# Patient Record
Sex: MALE | Race: WHITE | Employment: OTHER | URBAN - METROPOLITAN AREA
[De-identification: names, ages, dates, MRNs, and addresses within clinical notes are randomized per-mention and may not be internally consistent; named-entity substitution may affect disease eponyms.]

---

## 2017-04-19 PROBLEM — M17.11 PRIMARY OSTEOARTHRITIS OF RIGHT KNEE: Status: ACTIVE | Noted: 2017-04-19

## 2017-04-19 PROBLEM — M11.261 CHONDROCALCINOSIS OF RIGHT KNEE: Status: ACTIVE | Noted: 2017-04-19

## 2017-04-19 PROBLEM — M16.11 PRIMARY OSTEOARTHRITIS OF RIGHT HIP: Status: ACTIVE | Noted: 2017-04-19

## 2017-04-19 PROBLEM — M70.61 TROCHANTERIC BURSITIS, RIGHT HIP: Status: ACTIVE | Noted: 2017-04-19

## 2017-04-25 PROCEDURE — 82607 VITAMIN B-12: CPT | Performed by: OTHER

## 2017-05-19 PROBLEM — R26.89 IMPAIRED GAIT AND MOBILITY: Status: ACTIVE | Noted: 2017-05-19

## 2017-05-19 PROBLEM — M47.896 OTHER OSTEOARTHRITIS OF SPINE, LUMBAR REGION: Status: ACTIVE | Noted: 2017-05-19

## 2017-05-19 PROBLEM — G62.9: Status: ACTIVE | Noted: 2017-05-19

## 2017-06-09 ENCOUNTER — APPOINTMENT (OUTPATIENT)
Dept: GENERAL RADIOLOGY | Age: 67
End: 2017-06-09
Attending: EMERGENCY MEDICINE
Payer: MEDICARE

## 2017-06-09 ENCOUNTER — HOSPITAL ENCOUNTER (EMERGENCY)
Age: 67
Discharge: HOME | End: 2017-06-09
Attending: EMERGENCY MEDICINE
Payer: MEDICARE

## 2017-06-09 ENCOUNTER — APPOINTMENT (OUTPATIENT)
Dept: ULTRASOUND IMAGING | Age: 67
End: 2017-06-09
Attending: EMERGENCY MEDICINE
Payer: MEDICARE

## 2017-06-09 VITALS
HEIGHT: 73 IN | TEMPERATURE: 98 F | HEART RATE: 71 BPM | DIASTOLIC BLOOD PRESSURE: 97 MMHG | RESPIRATION RATE: 18 BRPM | WEIGHT: 315 LBS | OXYGEN SATURATION: 95 % | SYSTOLIC BLOOD PRESSURE: 213 MMHG | BODY MASS INDEX: 41.75 KG/M2

## 2017-06-09 DIAGNOSIS — R10.13 EPIGASTRIC PAIN: ICD-10-CM

## 2017-06-09 DIAGNOSIS — K21.9 GASTROESOPHAGEAL REFLUX DISEASE, ESOPHAGITIS PRESENCE NOT SPECIFIED: Primary | ICD-10-CM

## 2017-06-09 PROCEDURE — 71020 XR CHEST PA + LAT CHEST (CPT=71020): CPT | Performed by: EMERGENCY MEDICINE

## 2017-06-09 PROCEDURE — 76700 US EXAM ABDOM COMPLETE: CPT | Performed by: EMERGENCY MEDICINE

## 2017-06-09 PROCEDURE — 93010 ELECTROCARDIOGRAM REPORT: CPT

## 2017-06-09 PROCEDURE — 99285 EMERGENCY DEPT VISIT HI MDM: CPT

## 2017-06-09 PROCEDURE — 85025 COMPLETE CBC W/AUTO DIFF WBC: CPT | Performed by: EMERGENCY MEDICINE

## 2017-06-09 PROCEDURE — 36415 COLL VENOUS BLD VENIPUNCTURE: CPT

## 2017-06-09 PROCEDURE — 93005 ELECTROCARDIOGRAM TRACING: CPT

## 2017-06-09 PROCEDURE — 83690 ASSAY OF LIPASE: CPT | Performed by: EMERGENCY MEDICINE

## 2017-06-09 PROCEDURE — 84484 ASSAY OF TROPONIN QUANT: CPT | Performed by: EMERGENCY MEDICINE

## 2017-06-09 PROCEDURE — 80053 COMPREHEN METABOLIC PANEL: CPT | Performed by: EMERGENCY MEDICINE

## 2017-06-09 RX ORDER — MAGNESIUM HYDROXIDE/ALUMINUM HYDROXICE/SIMETHICONE 120; 1200; 1200 MG/30ML; MG/30ML; MG/30ML
30 SUSPENSION ORAL ONCE
Status: COMPLETED | OUTPATIENT
Start: 2017-06-09 | End: 2017-06-09

## 2017-06-09 NOTE — ED NOTES
Bedside report taken from [facility]. Patient states he has not taken any of his meds this morning and has not been sleeping well the last couple of days only getting a couple of hours a day.

## 2017-06-09 NOTE — ED NOTES
MD in to discuss plan of care with patient.   Patient is to take his BP meds at home and follow up with his PMD.

## 2017-06-09 NOTE — ED PROVIDER NOTES
Patient Seen in: THE [facility] Emergency Department In [place]    History   Patient presents with:  Dyspnea ROLDAN SOB (respiratory)  Hypertension (cardiovascular)    Stated Complaint: elevated bp, shortness of breath, back pain    HPI    Patient is a 66-year-o AmLODIPine Besylate 5 MG Oral Tab,  Take 1 tablet (5 mg total) by mouth daily. Potassium Chloride ER 10 MEQ Oral Tab CR,  Take 1 tablet (10 mEq total) by mouth 2 (two) times daily.    tamsulosin HCl 0.4 MG Oral Cap,  TAKE 1 CAPSULE (0.4 MG TOTAL) BY MOUTH Sister    • Hypertension Sister          Smoking Status: Never Smoker                      Smokeless Status: Never Used                        Alcohol Use: No                Review of Systems    Positive for stated complaint: elevated bp, shortness of steve components within normal limits   CBC W/ DIFFERENTIAL - Abnormal; Notable for the following:     RDW-SD 48.2 (*)     All other components within normal limits   TROPONIN I - Normal   CBC WITH DIFFERENTIAL WITH PLATELET    Narrative:      The following order Patient return of chest pain, shortness of breath, new or worse symptoms.         Disposition and Plan     Clinical Impression:  Gastroesophageal reflux disease, esophagitis presence not specified  (primary encounter diagnosis)  Epigastric pain    Disposit

## 2017-06-15 ENCOUNTER — HOSPITAL ENCOUNTER (OUTPATIENT)
Dept: ULTRASOUND IMAGING | Age: 67
Discharge: HOME | End: 2017-06-15
Attending: NURSE PRACTITIONER
Payer: MEDICARE

## 2017-06-15 DIAGNOSIS — I10 HYPERTENSION, ESSENTIAL: ICD-10-CM

## 2017-06-19 ENCOUNTER — HOSPITAL ENCOUNTER (OUTPATIENT)
Dept: ULTRASOUND IMAGING | Age: 67
Discharge: HOME | End: 2017-06-19
Attending: NURSE PRACTITIONER
Payer: MEDICARE

## 2017-06-19 PROCEDURE — 76775 US EXAM ABDO BACK WALL LIM: CPT | Performed by: NURSE PRACTITIONER

## 2017-06-19 PROCEDURE — 93975 VASCULAR STUDY: CPT | Performed by: NURSE PRACTITIONER

## 2017-07-25 PROCEDURE — 82607 VITAMIN B-12: CPT | Performed by: OTHER

## 2017-11-06 ENCOUNTER — HOSPITAL ENCOUNTER (INPATIENT)
Facility: HOSPITAL | Age: 67
LOS: 4 days | Discharge: HOME | DRG: 603 | End: 2017-11-10
Attending: EMERGENCY MEDICINE | Admitting: HOSPITALIST
Payer: MEDICARE

## 2017-11-06 ENCOUNTER — APPOINTMENT (OUTPATIENT)
Dept: ULTRASOUND IMAGING | Age: 67
DRG: 603 | End: 2017-11-06
Attending: EMERGENCY MEDICINE
Payer: MEDICARE

## 2017-11-06 DIAGNOSIS — I10 ESSENTIAL HYPERTENSION: ICD-10-CM

## 2017-11-06 DIAGNOSIS — L03.90 CELLULITIS, UNSPECIFIED CELLULITIS SITE: Primary | ICD-10-CM

## 2017-11-06 PROCEDURE — 99285 EMERGENCY DEPT VISIT HI MDM: CPT

## 2017-11-06 PROCEDURE — 96365 THER/PROPH/DIAG IV INF INIT: CPT

## 2017-11-06 PROCEDURE — 5A09357 ASSISTANCE WITH RESPIRATORY VENTILATION, LESS THAN 24 CONSECUTIVE HOURS, CONTINUOUS POSITIVE AIRWAY PRESSURE: ICD-10-PCS | Performed by: INTERNAL MEDICINE

## 2017-11-06 PROCEDURE — 84132 ASSAY OF SERUM POTASSIUM: CPT | Performed by: HOSPITALIST

## 2017-11-06 PROCEDURE — 36415 COLL VENOUS BLD VENIPUNCTURE: CPT

## 2017-11-06 PROCEDURE — 80048 BASIC METABOLIC PNL TOTAL CA: CPT | Performed by: EMERGENCY MEDICINE

## 2017-11-06 PROCEDURE — 96375 TX/PRO/DX INJ NEW DRUG ADDON: CPT

## 2017-11-06 PROCEDURE — 93971 EXTREMITY STUDY: CPT | Performed by: EMERGENCY MEDICINE

## 2017-11-06 PROCEDURE — 83036 HEMOGLOBIN GLYCOSYLATED A1C: CPT | Performed by: HOSPITALIST

## 2017-11-06 PROCEDURE — 87493 C DIFF AMPLIFIED PROBE: CPT | Performed by: HOSPITALIST

## 2017-11-06 PROCEDURE — 82962 GLUCOSE BLOOD TEST: CPT

## 2017-11-06 PROCEDURE — 85025 COMPLETE CBC W/AUTO DIFF WBC: CPT | Performed by: EMERGENCY MEDICINE

## 2017-11-06 PROCEDURE — 87040 BLOOD CULTURE FOR BACTERIA: CPT | Performed by: EMERGENCY MEDICINE

## 2017-11-06 PROCEDURE — 94660 CPAP INITIATION&MGMT: CPT

## 2017-11-06 RX ORDER — ARIPIPRAZOLE 15 MG/1
40 TABLET ORAL EVERY 4 HOURS
Status: COMPLETED | OUTPATIENT
Start: 2017-11-06 | End: 2017-11-06

## 2017-11-06 RX ORDER — ACETAMINOPHEN 325 MG/1
650 TABLET ORAL EVERY 6 HOURS PRN
Status: DISCONTINUED | OUTPATIENT
Start: 2017-11-06 | End: 2017-11-10

## 2017-11-06 RX ORDER — PREGABALIN 50 MG/1
50 CAPSULE ORAL DAILY
Status: DISCONTINUED | OUTPATIENT
Start: 2017-11-06 | End: 2017-11-10

## 2017-11-06 RX ORDER — CLONIDINE HYDROCHLORIDE 0.2 MG/1
0.2 TABLET ORAL 3 TIMES DAILY
Status: DISCONTINUED | OUTPATIENT
Start: 2017-11-06 | End: 2017-11-09

## 2017-11-06 RX ORDER — HEPARIN SODIUM 5000 [USP'U]/ML
5000 INJECTION, SOLUTION INTRAVENOUS; SUBCUTANEOUS EVERY 8 HOURS SCHEDULED
Status: DISCONTINUED | OUTPATIENT
Start: 2017-11-06 | End: 2017-11-06

## 2017-11-06 RX ORDER — OLMESARTAN MEDOXOMIL AND HYDROCHLOROTHIAZIDE 40/25 40; 25 MG/1; MG/1
1 TABLET ORAL
Status: DISCONTINUED | OUTPATIENT
Start: 2017-11-06 | End: 2017-11-06 | Stop reason: RX

## 2017-11-06 RX ORDER — POTASSIUM CHLORIDE 20 MEQ/1
40 TABLET, EXTENDED RELEASE ORAL EVERY 4 HOURS
Status: DISCONTINUED | OUTPATIENT
Start: 2017-11-06 | End: 2017-11-06

## 2017-11-06 RX ORDER — BISACODYL 10 MG
10 SUPPOSITORY, RECTAL RECTAL
Status: DISCONTINUED | OUTPATIENT
Start: 2017-11-06 | End: 2017-11-10

## 2017-11-06 RX ORDER — DEXTROSE MONOHYDRATE 25 G/50ML
50 INJECTION, SOLUTION INTRAVENOUS
Status: DISCONTINUED | OUTPATIENT
Start: 2017-11-06 | End: 2017-11-10

## 2017-11-06 RX ORDER — ATORVASTATIN CALCIUM 10 MG/1
10 TABLET, FILM COATED ORAL NIGHTLY
Status: DISCONTINUED | OUTPATIENT
Start: 2017-11-06 | End: 2017-11-10

## 2017-11-06 RX ORDER — ALLOPURINOL 300 MG/1
300 TABLET ORAL DAILY
Status: DISCONTINUED | OUTPATIENT
Start: 2017-11-06 | End: 2017-11-10

## 2017-11-06 RX ORDER — ALFUZOSIN HYDROCHLORIDE 10 MG/1
10 TABLET, EXTENDED RELEASE ORAL
Status: DISCONTINUED | OUTPATIENT
Start: 2017-11-06 | End: 2017-11-10

## 2017-11-06 RX ORDER — HEPARIN SODIUM 5000 [USP'U]/ML
7500 INJECTION, SOLUTION INTRAVENOUS; SUBCUTANEOUS EVERY 8 HOURS SCHEDULED
Status: DISCONTINUED | OUTPATIENT
Start: 2017-11-06 | End: 2017-11-10

## 2017-11-06 RX ORDER — ALBUTEROL SULFATE 90 UG/1
2 AEROSOL, METERED RESPIRATORY (INHALATION) EVERY 4 HOURS PRN
Status: DISCONTINUED | OUTPATIENT
Start: 2017-11-06 | End: 2017-11-10

## 2017-11-06 RX ORDER — MORPHINE SULFATE 4 MG/ML
4 INJECTION, SOLUTION INTRAMUSCULAR; INTRAVENOUS ONCE
Status: COMPLETED | OUTPATIENT
Start: 2017-11-06 | End: 2017-11-06

## 2017-11-06 RX ORDER — SERTRALINE HYDROCHLORIDE 100 MG/1
100 TABLET, FILM COATED ORAL DAILY
Status: DISCONTINUED | OUTPATIENT
Start: 2017-11-06 | End: 2017-11-10

## 2017-11-06 RX ORDER — AMLODIPINE BESYLATE 5 MG/1
10 TABLET ORAL DAILY
Status: DISCONTINUED | OUTPATIENT
Start: 2017-11-06 | End: 2017-11-10

## 2017-11-06 RX ORDER — ONDANSETRON 2 MG/ML
4 INJECTION INTRAMUSCULAR; INTRAVENOUS EVERY 6 HOURS PRN
Status: DISCONTINUED | OUTPATIENT
Start: 2017-11-06 | End: 2017-11-10

## 2017-11-06 RX ORDER — SODIUM PHOSPHATE, DIBASIC AND SODIUM PHOSPHATE, MONOBASIC 7; 19 G/133ML; G/133ML
1 ENEMA RECTAL ONCE AS NEEDED
Status: DISCONTINUED | OUTPATIENT
Start: 2017-11-06 | End: 2017-11-10

## 2017-11-06 RX ORDER — DOCUSATE SODIUM 100 MG/1
100 CAPSULE, LIQUID FILLED ORAL 2 TIMES DAILY
Status: DISCONTINUED | OUTPATIENT
Start: 2017-11-06 | End: 2017-11-10

## 2017-11-06 RX ORDER — POTASSIUM CHLORIDE 20 MEQ/1
40 TABLET, EXTENDED RELEASE ORAL ONCE
Status: COMPLETED | OUTPATIENT
Start: 2017-11-06 | End: 2017-11-06

## 2017-11-06 RX ORDER — COLCHICINE 0.6 MG/1
0.6 TABLET ORAL
Status: DISCONTINUED | OUTPATIENT
Start: 2017-11-06 | End: 2017-11-10

## 2017-11-06 RX ORDER — POLYETHYLENE GLYCOL 3350 17 G/17G
17 POWDER, FOR SOLUTION ORAL DAILY PRN
Status: DISCONTINUED | OUTPATIENT
Start: 2017-11-06 | End: 2017-11-10

## 2017-11-06 RX ORDER — HYDRALAZINE HYDROCHLORIDE 50 MG/1
100 TABLET, FILM COATED ORAL 3 TIMES DAILY
Status: DISCONTINUED | OUTPATIENT
Start: 2017-11-06 | End: 2017-11-10

## 2017-11-06 NOTE — PAYOR COMM NOTE
--------------  ADMISSION REVIEW     Payor: Mag Davidson  Subscriber #:  Iram Heady  Authorization Number: N/[id number]    Admit date: 11/6/17  Admit time: 2008       Admitting Physician: Cheryl Blanchard MD  Attending Physician:  Art Engle DO  Primary Care Physi (37 °C)   Resp 16   Ht 185.4 cm (6' 1\")   Wt (!) 165.6 kg   SpO2 94%   BMI 48.16 kg/m²[SA.2]         Physical Exam   Constitutional: He is oriented to person, place, and time. He appears well-developed and well-nourished. Non-toxic appearance.  No distres the past 24 hours he will be admitted for further management.   Patient stable for the floor at this time.[SA.3]        Disposition and Plan     Clinical Impression:[SA.1]  Cellulitis, unspecified cellulitis site  (primary encounter diagnosis)[SA.2]    Disp furosemide (LASIX) tab 60 mg     Date Action Dose Route User    11/6/2017 1013 Given 60 mg Oral Silvino Costello RN

## 2017-11-06 NOTE — ED NOTES
PT return from [address] at this time, EAS contacted 40 min ETA given at this time

## 2017-11-06 NOTE — H&P
[facility] Hospitalist H&P       CC: Patient presents with:  Cellulitis (integumentary, infectious)       PCP: Fariba Godinez MD    History of Present Illness:  Mr. Viramontes Sender is a 70 yo male with PMH of morbid obesity, Dm, HTN, LATESHA who presented with concern Tab Take 1 tablet (100 mg total) by mouth daily. Disp: 90 tablet Rfl: 3   furosemide 20 MG Oral Tab Take 3 tablets (60 mg total) by mouth daily. Disp: 180 tablet Rfl: 3   tamsulosin HCl 0.4 MG Oral Cap TAKE 1 CAPSULE (0.4 MG TOTAL) BY MOUTH ONCE DAILY.  Dis Sister        Review of Systems  12 point ROS negative, except as stated in HPI    OBJECTIVE:  /58 (BP Location: Right arm)   Pulse 83   Temp 98.8 °F (37.1 °C) (Oral)   Resp 22   Ht 6' 1\" (1.854 m)   Wt (!) 365 lb (165.6 kg)   SpO2 95%   BMI 48.16 k venous system. B-mode two-dimensional images of the vascular structures, Doppler spectral analysis, and color flow. Doppler imaging were performed.   The following veins were imaged:  Common, deep, and superficial femoral, popliteal, sapheno-femoral juncti

## 2017-11-06 NOTE — CM/SW NOTE
11/06/17 1400   CM/SW Referral Data   Referral Source Physician;Social Work (self-referral)   Reason for Referral Discharge planning   Informant Patient   Pertinent Medical Hx   Primary Care Physician Name [facility]   Patient Info   Patient's Mental

## 2017-11-06 NOTE — CONSULTS
INFECTIOUS DISEASE CONSULTATION    Luis Alberto Crawley Patient Status:  Inpatient    10/29/1950 MRN [id number]   [facility] 5NW-A Attending Lina Bee, [address] [facility] Day # 0 WILFREDO Kramer 136 0.5ml, 0.5 mL, Intramuscular, Prior to discharge  •  Albuterol Sulfate  (90 Base) MCG/ACT inhaler 2 puff, 2 puff, Inhalation, Q4H PRN  •  allopurinol (ZYLOPRIM) tab 300 mg, 300 mg, Oral, Daily  •  AmLODIPine Besylate (NORVASC) tab 10 mg, 10 mg, Oral (LEVEMIR) 100 UNIT/ML flextouch 50 Units, 50 Units, Subcutaneous, Q12H  •  Heparin Sodium (Porcine) 5000 UNIT/ML injection 7,500 Units, 7,500 Units, Subcutaneous, Q8H [address]  •  potassium chloride (K-SOL) 40 meq/30 ml (10%) oral solution 40 mEq, 40 mEq, Oral, pain without sciatica     Chondrocalcinosis of right knee     Primary osteoarthritis of right hip     Trochanteric bursitis, right hip     Primary osteoarthritis of right knee     Other osteoarthritis of spine, lumbar region     Impaired gait and mobility

## 2017-11-06 NOTE — ED PROVIDER NOTES
Patient Seen in: [facility] Emergency Department In [place]    History   Patient presents with:  Cellulitis (integumentary, infectious)    Stated Complaint: cellulitis    HPI    38-year-old gentleman history of diabetes among the medical problems outlined reviewed. All other systems reviewed and negative except as noted above. PSFH elements reviewed from today and agreed except as otherwise stated in HPI.     Physical Exam   ED Triage Vitals [11/06/17 0200]  BP: (!) 204/73  Pulse: 60  Resp: 18  Temp: Narrative: The following orders were created for panel order CBC WITH DIFFERENTIAL WITH PLATELET.   Procedure                               Abnormality         Status                     ---------                               -----------         ------ follow-up provider specified.     Medications Prescribed:  Current Discharge Medication List        Present on Admission  Date Reviewed: 7/24/2017          ICD-10-CM Noted POA    Cellulitis L03.90 11/6/2017 Unknown

## 2017-11-06 NOTE — PLAN OF CARE
Assumed care at 0730. Pt alert, oriented, on RA. Up ad federica. Swelling and redness in left thigh and calf, on Ancef and Rocephin. Cdiff sample sent. Voiding. Accu checks qid. Care endorsed to [facility], [address] [place] [address] at [phone].

## 2017-11-06 NOTE — PROGRESS NOTES
[facility] Progress Note:  Anticoagulation Weight Dose Adjustment for heparin    Babita Guerra is a 69year old male who has been prescribed heparin 5000 units every 8 hrs for VTE prophylaxis.       Estimated Creatinine Clearance: 79.6 mL/min (based on

## 2017-11-06 NOTE — PROGRESS NOTES
NURSING ADMISSION NOTE      Patient admitted via Cart  Oriented to room 513   Safety precautions initiated. Bed in low position. Call light in reach. Pt arrived to [facility] from [place] ED. Pt A/O x 3, no distress noted.  Admission database completed

## 2017-11-07 PROCEDURE — 82962 GLUCOSE BLOOD TEST: CPT

## 2017-11-07 PROCEDURE — 83735 ASSAY OF MAGNESIUM: CPT | Performed by: HOSPITALIST

## 2017-11-07 PROCEDURE — 85025 COMPLETE CBC W/AUTO DIFF WBC: CPT | Performed by: HOSPITALIST

## 2017-11-07 PROCEDURE — 80048 BASIC METABOLIC PNL TOTAL CA: CPT | Performed by: HOSPITALIST

## 2017-11-07 PROCEDURE — 84132 ASSAY OF SERUM POTASSIUM: CPT | Performed by: INTERNAL MEDICINE

## 2017-11-07 RX ORDER — HYDRALAZINE HYDROCHLORIDE 20 MG/ML
10 INJECTION INTRAMUSCULAR; INTRAVENOUS EVERY 6 HOURS PRN
Status: DISCONTINUED | OUTPATIENT
Start: 2017-11-07 | End: 2017-11-10

## 2017-11-07 RX ORDER — ARIPIPRAZOLE 15 MG/1
40 TABLET ORAL EVERY 4 HOURS
Status: COMPLETED | OUTPATIENT
Start: 2017-11-07 | End: 2017-11-07

## 2017-11-07 RX ORDER — POTASSIUM CHLORIDE 20 MEQ/1
40 TABLET, EXTENDED RELEASE ORAL EVERY 4 HOURS
Status: DISCONTINUED | OUTPATIENT
Start: 2017-11-07 | End: 2017-11-07

## 2017-11-07 NOTE — RESPIRATORY THERAPY NOTE
LATESHA Equipment Usage Summary :            Set Mode :BIPAP W FLEX          Usage in Hours:6;40          90% Pressure (EPAP) : 16           90% Insp Pressure (IPAP);22          AHI : 2.8          Supplemental Oxygen :      LPM

## 2017-11-07 NOTE — PLAN OF CARE
Diabetes/Glucose Control    • Glucose maintained within prescribed range Progressing        PAIN - ADULT    • Verbalizes/displays adequate comfort level or patient's stated pain goal Progressing        Patient/Family Goals    • Patient/Family [facility]

## 2017-11-07 NOTE — PROGRESS NOTES
PROBLEM: LLE cellulitis    ASSESSMENT: Assumed care of pt at 11:30am. Pt is A&O x4. VSS. Pt reported having chills and generalized aches this afternoon; low-grade fever noted at this time. Administered Tylenol, with relief.  Maintaining O2 sat WNL on RA, de

## 2017-11-07 NOTE — PLAN OF CARE
Diabetes/Glucose Control    • Glucose maintained within prescribed range Progressing        PAIN - ADULT    • Verbalizes/displays adequate comfort level or patient's stated pain goal Progressing        Patient/Family Goals    • Patient/Family [facility] tylenol administered with relief. LLE swollen, elevated while in bed. Patient states he has loose stools but this is improving. Blood glucose monitored as ordered. BP monitored, systolics remaining elevated, WCTM. Good appetite for meals. Up self.  IV ABX,

## 2017-11-07 NOTE — PROGRESS NOTES
[facility] Hospitalist Progress Note     [facility] Test Patient Status:  Emergency    10/10/1982 MRN [id number]   Location [address] Nathaly Vázquez Attending No att. providers found   [facility] Day # 27 PCP No primary p 0727   [facility]  144*  169*  171*  131*  80       Imaging:  Us Venous Doppler Leg Left - Diag Img (cpt=93971)    Result Date: 11/6/2017  PROCEDURE:  ULTRASOUND VENOUS FLOW LEFT LEG  COMPARISON:  None.   INDICATIONS:  eval for DVT  TECHNIQUE:  Real time, grey sc (APRESOLINE) tab 100 mg 100 mg Oral TID   pregabalin (LYRICA) cap 50 mg 50 mg Oral Daily   Sertraline HCl (ZOLOFT) tab 100 mg 100 mg Oral Daily   Alfuzosin HCl ER (UROXATRAL) 24 hr tab 10 mg 10 mg Oral Daily with breakfast   acetaminophen (TYLENOL) tab 650 42 units in PM( at home on glargine 65 u in AM and 50 u at bedtime)  - SSC insulin  - Hold home oral hypoglycemic agents     # Gout  - Continue allopurinol and colchicine     # Depression  - Continue zoloft     # HLD  - Continue statin     Prophy:  DVT: he

## 2017-11-08 PROCEDURE — 82962 GLUCOSE BLOOD TEST: CPT

## 2017-11-08 PROCEDURE — 85025 COMPLETE CBC W/AUTO DIFF WBC: CPT | Performed by: INTERNAL MEDICINE

## 2017-11-08 PROCEDURE — 84132 ASSAY OF SERUM POTASSIUM: CPT | Performed by: INTERNAL MEDICINE

## 2017-11-08 RX ORDER — ARIPIPRAZOLE 15 MG/1
40 TABLET ORAL EVERY 4 HOURS
Status: COMPLETED | OUTPATIENT
Start: 2017-11-08 | End: 2017-11-08

## 2017-11-08 NOTE — PROGRESS NOTES
[facility] Hospitalist Progress Note     [facility] Test Patient Status:  Emergency    10/10/1982 MRN [id number]   Location [address] Nathaly Vázquez Attending No att. providers found   [facility] Day # 27 PCP No primary p DBIL, TPROT    Recent Labs   Lab  11/07/17   1148  11/07/17   1618  11/07/17   2114  11/08/17   0708  11/08/17   1131   PGLU  160*  119*  141*  71  166*       Imaging:  Us Venous Doppler Leg Left - Diag Img (cpt=93971)    Result Date: 11/6/2017  PROCEDURE: atorvastatin (LIPITOR) tab 10 mg 10 mg Oral Nightly   CloNIDine HCl (CATAPRES) tab 0.2 mg 0.2 mg Oral TID   colchicine tab 0.6 mg 0.6 mg Oral Daily   furosemide (LASIX) tab 60 mg 60 mg Oral Daily   hydrALAzine HCl (APRESOLINE) tab 100 mg 100 mg Oral TID ordered, keep leg elevated     # HTN  - Continue home meds  - Clonidine, amlodipine, olmesarten-HCTZ, hydralazine, lasix 60 mg     # DM Type 2  - Insulin levemir 50 units in AM and decreased to 35 units in PM (at home on glargine 65 u in AM and 50 u at bed

## 2017-11-08 NOTE — PROGRESS NOTES
[facility]                INFECTIOUS DISEASE PROGRESS NOTE    Buffy Sanders Patient Status:  Inpatient    10/29/1950 MRN [id number]   [facility] 5NW-A Attending Anay Monson MD   Hosp Day # 2 PCP Daniel Gee MD Collected: 11/06/17 0228   Order Status: Completed Lab Status: Preliminary result Updated: 11/07/17 1000   Specimen: Blood from Blood,peripheral     Blood Culture Result No Growth 1 Day             Problem list reviewed:  Patient Active Problem List:     C

## 2017-11-08 NOTE — RESPIRATORY THERAPY NOTE
LATESHA - Equipment Use Daily Summary:                  . Set Mode: BI-FLEX                . Usage in hours: 9:46                . 90% Pressure (EPAP) level: 16                . 90% Insp. Pressure (IPAP): 22                . AHI: 2.9                .  Supplement

## 2017-11-08 NOTE — PLAN OF CARE
Diabetes/Glucose Control    • Glucose maintained within prescribed range Progressing        PAIN - ADULT    • Verbalizes/displays adequate comfort level or patient's stated pain goal Progressing        Patient/Family Goals    • Patient/Family [facility]

## 2017-11-09 PROCEDURE — 93010 ELECTROCARDIOGRAM REPORT: CPT | Performed by: INTERNAL MEDICINE

## 2017-11-09 PROCEDURE — 80048 BASIC METABOLIC PNL TOTAL CA: CPT | Performed by: INTERNAL MEDICINE

## 2017-11-09 PROCEDURE — 93005 ELECTROCARDIOGRAM TRACING: CPT

## 2017-11-09 PROCEDURE — 82962 GLUCOSE BLOOD TEST: CPT

## 2017-11-09 PROCEDURE — 84439 ASSAY OF FREE THYROXINE: CPT | Performed by: INTERNAL MEDICINE

## 2017-11-09 PROCEDURE — 84244 ASSAY OF RENIN: CPT | Performed by: INTERNAL MEDICINE

## 2017-11-09 PROCEDURE — 84443 ASSAY THYROID STIM HORMONE: CPT | Performed by: INTERNAL MEDICINE

## 2017-11-09 PROCEDURE — 82088 ASSAY OF ALDOSTERONE: CPT | Performed by: INTERNAL MEDICINE

## 2017-11-09 RX ORDER — CLONIDINE HYDROCHLORIDE 0.1 MG/1
0.1 TABLET ORAL 3 TIMES DAILY
Status: DISCONTINUED | OUTPATIENT
Start: 2017-11-10 | End: 2017-11-10

## 2017-11-09 RX ORDER — CEPHALEXIN 500 MG/1
1000 CAPSULE ORAL 3 TIMES DAILY
Qty: 90 CAPSULE | Refills: 0 | Status: SHIPPED | OUTPATIENT
Start: 2017-11-09 | End: 2017-11-24

## 2017-11-09 RX ORDER — PENICILLIN V POTASSIUM 500 MG/1
500 TABLET ORAL 2 TIMES DAILY
Qty: 60 TABLET | Refills: 2 | Status: ON HOLD | OUTPATIENT
Start: 2017-11-24 | End: 2018-03-25

## 2017-11-09 RX ORDER — SPIRONOLACTONE 25 MG/1
25 TABLET ORAL DAILY
Status: DISCONTINUED | OUTPATIENT
Start: 2017-11-10 | End: 2017-11-10

## 2017-11-09 NOTE — PLAN OF CARE
CARDIOVASCULAR - ADULT    • Maintains optimal cardiac output and hemodynamic stability Progressing    • Absence of cardiac arrhythmias or at baseline Progressing          PAIN - ADULT    • Verbalizes/displays adequate comfort level or patient's stated pain polyneuropathy     Cellulitis     Cellulitis, unspecified cellulitis site       Active issue(s) requiring resolution prior to discharge: Heart rate, receiving IV ABX    Anticipated discharge date:  Tomorrow    Current discharge plan: Home     PATIENT IS CHARLEEN

## 2017-11-09 NOTE — PLAN OF CARE
Diabetes/Glucose Control    • Glucose maintained within prescribed range Progressing        PAIN - ADULT    • Verbalizes/displays adequate comfort level or patient's stated pain goal Progressing        Patient/Family Goals    • Patient/Family [facility] administered with relief. LLE swollen, elevated while in bed. Compression stocking placed, patient tolerated well. Blood glucose monitored as ordered. Good appetite for meals. Up self. IV ABX, tolerating well. K+ replaced via protocol.  Patient & family upd

## 2017-11-09 NOTE — RESPIRATORY THERAPY NOTE
LATESHA - Equipment Use Daily Summary:                  . Set Mode: BI-FLEX                . Usage in hours: 6:49                . 90% Pressure (EPAP) level: 16                . 90% Insp. Pressure (IPAP): 22                . AHI: 4.7                .  Supplement

## 2017-11-09 NOTE — PROGRESS NOTES
[facility] Hospitalist Progress Note     [facility] Test Patient Status:  Emergency    10/10/1982 MRN [id number]   Location [address] Nathaly Vázquez Attending No att. providers found   [facility] Day # 27 PCP No primary p --    --   82       No results for input(s): ALT, AST, ALB, AMYLASE, LIPASE, LDH in the last 72 hours.     Invalid input(s): ALPHOS, TBIL, DBIL, TPROT    Recent Labs   Lab  11/08/17   1131  11/08/17   1617  11/08/17   2126  11/09/17   0721  11/09/17   1145 detemir (LEVEMIR) 100 UNIT/ML flextouch 50 Units 50 Units Subcutaneous Daily   influenza virus vaccine (FLUAD) ages 72 years and older inj 0.5ml 0.5 mL Intramuscular Prior to discharge   Albuterol Sulfate  (90 Base) MCG/ACT inhaler 2 puff 2 puff Inh Daily   Heparin Sodium (Porcine) 5000 UNIT/ML injection 7,500 Units 7,500 Units Subcutaneous Q8H [facility]        Assessment/Plan:      69year old male with PMH of DM type 2, HTN, LATESHA who presented with cellulitis.     # Bradycardia  - HR dropped down to 30's wi

## 2017-11-09 NOTE — PROGRESS NOTES
[facility]                INFECTIOUS DISEASE PROGRESS NOTE    Alessia Price Patient Status:  Inpatient    10/29/1950 MRN [id number]   [facility] 5NW-A Attending Tor Peterson MD   Hosp Day # 3 PCP Angie Blanco MD [phone]   Order Status: Completed Lab Status: Preliminary result Updated: 11/07/17 1000   Specimen: Blood from Blood,peripheral     Blood Culture Result No Growth 1 Day             Problem list reviewed:  Patient Active Problem List:     Cataract     Allergic

## 2017-11-09 NOTE — CONSULTS
[facility] [address] [place] [address] Cardiology  Report of Consultation    Ariadne Gonzalez Patient Status:  Inpatient    10/29/1950 MRN [id number]   [facility] 5NW-A Attending Neyda Sweeney MD   Hosp Day # 3 PCP Daniele Forrester MD     Reason for Co Smokeless tobacco: Never Used                      Alcohol use:  No                     Medications:   Scheduled:   • insulin detemir  27 Units Subcutaneous Nightly   • insulin detemir  50 Units Subcutaneous Daily   • (three) times daily. Disp: 270 tablet Rfl: 3   AmLODIPine Besylate 10 MG Oral Tab Take 1 tablet (10 mg total) by mouth daily. Disp: 90 tablet Rfl: 3   CloNIDine HCl 0.2 MG Oral Tab Take 1 tablet (0.2 mg total) by mouth 3 (three) times daily.  Disp: 90 table from Last 3 Encounters:  11/09/17 : (!) 365 lb (165.6 kg)  08/28/17 : (!) 368 lb (166.9 kg)  07/25/17 : (!) 362 lb (164.2 kg)          General: Well developed, well nourished obese male. Pt is in no acute distress. HEENT:   Normocephalic. Atraumatic.   E input(s): TROP, CK in the last 72 hours. Diagnostics:   Tele:  SR / SB. Periods of 1 AVB. Periods of 2 AVB type I  EKG: SR.  1 AVB. 2  AVB type I, Cannot R/O IWMI, age undetermined. Assessment:  1.   Bradycardia   -Medication effect   -LATESHA related

## 2017-11-10 VITALS
BODY MASS INDEX: 41.75 KG/M2 | WEIGHT: 315 LBS | SYSTOLIC BLOOD PRESSURE: 167 MMHG | OXYGEN SATURATION: 99 % | DIASTOLIC BLOOD PRESSURE: 66 MMHG | HEART RATE: 67 BPM | TEMPERATURE: 99 F | RESPIRATION RATE: 18 BRPM | HEIGHT: 73 IN

## 2017-11-10 PROCEDURE — 82962 GLUCOSE BLOOD TEST: CPT

## 2017-11-10 RX ORDER — CLONIDINE HYDROCHLORIDE 0.1 MG/1
0.1 TABLET ORAL 3 TIMES DAILY
Qty: 90 TABLET | Refills: 0 | Status: SHIPPED | OUTPATIENT
Start: 2017-11-10 | End: 2017-11-30

## 2017-11-10 RX ORDER — SPIRONOLACTONE 25 MG/1
25 TABLET ORAL DAILY
Qty: 30 TABLET | Refills: 0 | Status: SHIPPED | OUTPATIENT
Start: 2017-11-11 | End: 2017-11-30

## 2017-11-10 NOTE — PROGRESS NOTES
[facility] [address] [facility] Cardiology  Progress Note    Gillermina Scale Patient Status:  Inpatient    10/29/1950 MRN [id number]   [facility] 5NW-A Attending Stalin Garcia MD   [facility] Day # 4 PCP Fco West MD     Subjective:   No chest Glucose-Vitamin C    Intake/Output:     Intake/Output Summary (Last 24 hours) at 11/10/17 1224  Last data filed at 11/10/17 0850   Gross per 24 hour   Intake             1138 ml   Output                0 ml   Net             1138 ml       Wt Readings from results for input(s): PTP, INR in the last 72 hours. No results for input(s): TROP, CK in the last 72 hours. Tele: SR. Int 1 AVB. Int 2 AVB type 1 during sleep      Assessment:    1. Bradycardia. Nocturnal only and asymptomatic.                -

## 2017-11-10 NOTE — PROGRESS NOTES
[facility]                INFECTIOUS DISEASE PROGRESS NOTE    Cameron Andrade Patient Status:  Inpatient    10/29/1950 MRN [id number]   [facility] 5NW-A Attending Eli Nguyễn MD   [facility] Day # 4 PCP Chapito Islas MD hypertension     Puncture wound of foot, left, initial encounter     Chronic bilateral low back pain without sciatica     Chondrocalcinosis of right knee     Primary osteoarthritis of right hip     Trochanteric bursitis, right hip     Primary osteoarthriti

## 2017-11-10 NOTE — RESPIRATORY THERAPY NOTE
LATESHA - Equipment Use Daily Summary:                  . Set Mode:BI-FLEX                . Usage in hours: 5:27                . 90% Pressure (EPAP) level: 16                . 90% Insp. Pressure (IPAP): 22                . AHI: 2.0                .  Supplementa

## 2017-11-10 NOTE — DISCHARGE SUMMARY
General Medicine Discharge Summary     Patient ID:  Rose Alanis  69year old  10/29/1950    Admit date: 11/6/2017    Discharge date and time: 11/10/2017    Attending Physician: James Ramirez MD     Primary Care Physician: Otilio Salazar MD to PO keflex as outpatient with penicillin V as ppx   - Blood cultures x 2 NGTD  - ID c/s, appreciate recs  - US done of leg with edema, no DVT  - Compression stockings, keep leg elevated     # HTN  - Continue home meds  - amlodipine, olmesarten-HCTZ, hydr HCT) 40-25 MG Oral Tab  Take 1 tablet by mouth once daily. allopurinol 300 MG Oral Tab  Take 1 tablet (300 mg total) by mouth daily. pregabalin 50 MG Oral Cap  Take 1 capsule (50 mg total) by mouth daily.     Azelastine HCl (ASTEPRO) 0.15 % Nasal Solu

## 2017-11-10 NOTE — PROGRESS NOTES
NURSING DISCHARGE NOTE     Discharged Home via Ambulatory. Accompanied by Spouse  Belongings Taken by patient/family.     Pt discharged home with spouse. IV and tele removed. Discharge paperwork given. All questions and concerns addressed.  Pt verbalized u

## 2017-11-10 NOTE — PLAN OF CARE
CARDIOVASCULAR - ADULT    • Maintains optimal cardiac output and hemodynamic stability Progressing    • Absence of cardiac arrhythmias or at baseline Progressing        Diabetes/Glucose Control    • Glucose maintained within prescribed range Progressing Trochanteric bursitis, right hip     Primary osteoarthritis of right knee     Other osteoarthritis of spine, lumbar region     Impaired gait and mobility     Generalized polyneuropathy     Cellulitis     Cellulitis, unspecified cellulitis site       Active

## 2017-11-13 NOTE — CM/SW NOTE
Patient discharged on 11/10/2017 as previously planned.      11/13/17 0900   Discharge disposition   Discharged to: Home or [address] [place] [address] services after discharge Patient refused services   Discharge transportation Private car

## 2017-11-13 NOTE — PAYOR COMM NOTE
--------------  DISCHARGE REVIEW    Payor: Latha Hi #Toribio Mamie  Authorization Number: [id number]    Admit date: 11/6/17  Admit time:  8738  Discharge Date: 11/10/2017  2:58 PM     Admitting Physician: Storm Ko MD  Attending Physician

## 2018-01-19 ENCOUNTER — OFFICE VISIT (OUTPATIENT)
Dept: WOUND CARE | Age: 68
End: 2018-01-19
Attending: NURSE PRACTITIONER
Payer: MEDICARE

## 2018-01-19 DIAGNOSIS — E11.40 DIABETIC NEUROPATHY (HCC): ICD-10-CM

## 2018-01-19 DIAGNOSIS — L97.521 NON-PRESSURE CHRONIC ULCER OF OTHER PART OF LEFT FOOT LIMITED TO BREAKDOWN OF SKIN (HCC): Primary | ICD-10-CM

## 2018-01-19 DIAGNOSIS — E11.621 DM FOOT ULCER (HCC): ICD-10-CM

## 2018-01-19 DIAGNOSIS — G62.9 POLYNEUROPATHY: ICD-10-CM

## 2018-01-19 DIAGNOSIS — L97.509 DM FOOT ULCER (HCC): ICD-10-CM

## 2018-01-19 PROCEDURE — 97597 DBRDMT OPN WND 1ST 20 CM/<: CPT

## 2018-01-20 NOTE — PROGRESS NOTES
Progress Note Details  Patient Name: [facility]   Patient Number: [id number]  Patient YOB: 1950  Date: 1/19/2018  Physician / Shaun Cap: Abby Blanco Post[address] 1: Anahy 42    Chief Complaint  This information was obtained fr Cancer - Sibling, Child, Diabetes - Mother, Heart Disease - Mother, Father, Hypertension - Maternal Grandparents, Stroke - Maternal Grandparents, Father, Thyroid Problems - Child    Social History  This information was obtained from the patient, chart  Nev Gastrointestinal (GI): Change in Bowel Habits, Nausea / Vomiting / Diarrhea (N/V/D), Loss of Appetite, Difficulty Swallowing, Stomach/abdominal pain  Musculoskeletal: Assistive Devices  Integumentary (Hair/Skin/Nails): Rash, Prone to Skin Tears, Itching, H silver sulfadiazine 1 % topical cream topical cream topical twice daily        Objective    Constitutional  BP Elevated, on antihypertensive meds, asymptomatic. Pulse RRR. RR within normal limits. Afebrile. Obesity.  Alert, calm, well developed, in no appar The periwound skin exhibited: Callus, Moist, Maceration. The periwound skin did not exhibit: Excoriation, Induration, Crepitus, Fluctuance, Erythema, Hemosiderosis, Pallor, Rubor. The temperature of the periwound skin is Cool.  Periwound skin does not exhib (Encounter Diagnosis) E11.621 - Type 2 diabetes mellitus with foot ulcer  (Encounter Diagnosis) E11.40 - Type 2 diabetes mellitus with diabetic neuropathy, unspecified  (Encounter Diagnosis) G62.9 - Polyneuropathy, unspecified  (Encounter Diagnosis) E66.8 Workflow: Diabetes  Perfusion assessed by FRANCIA/TBI - 1/19/18 bedside TBI left 0.77 and right 1.1  Perfusion assessed by doppler.  - 1/19/18-Triphasic signals BLE DP and PT  Last sharp debridement date: - 1/19/18  Last A1C date and value: - 11/6/17 HBA1C 8.1-

## 2018-01-25 ENCOUNTER — LAB ENCOUNTER (OUTPATIENT)
Dept: LAB | Age: 68
End: 2018-01-25
Attending: FAMILY MEDICINE
Payer: MEDICARE

## 2018-01-25 DIAGNOSIS — L97.521 ULCER OF TOE OF LEFT FOOT, LIMITED TO BREAKDOWN OF SKIN (HCC): Primary | ICD-10-CM

## 2018-01-25 DIAGNOSIS — M86.9 DIABETIC FOOT ULCER WITH OSTEOMYELITIS (HCC): ICD-10-CM

## 2018-01-25 DIAGNOSIS — L97.509 DIABETIC FOOT ULCER WITH OSTEOMYELITIS (HCC): ICD-10-CM

## 2018-01-25 DIAGNOSIS — E11.621 DIABETIC FOOT ULCER WITH OSTEOMYELITIS (HCC): ICD-10-CM

## 2018-01-25 DIAGNOSIS — E11.69 DIABETIC FOOT ULCER WITH OSTEOMYELITIS (HCC): ICD-10-CM

## 2018-01-25 LAB
ALBUMIN SERPL-MCNC: 3.6 G/DL (ref 3.5–4.8)
ALP LIVER SERPL-CCNC: 81 U/L (ref 45–117)
ALT SERPL-CCNC: 36 U/L (ref 17–63)
AST SERPL-CCNC: 24 U/L (ref 15–41)
BASOPHILS # BLD AUTO: 0.08 X10(3) UL (ref 0–0.1)
BASOPHILS NFR BLD AUTO: 1.1 %
BILIRUB SERPL-MCNC: 0.4 MG/DL (ref 0.1–2)
BUN BLD-MCNC: 21 MG/DL (ref 8–20)
CALCIUM BLD-MCNC: 8.9 MG/DL (ref 8.3–10.3)
CHLORIDE: 104 MMOL/L (ref 101–111)
CO2: 28 MMOL/L (ref 22–32)
CREAT BLD-MCNC: 1.16 MG/DL (ref 0.7–1.3)
EOSINOPHIL # BLD AUTO: 0.41 X10(3) UL (ref 0–0.3)
EOSINOPHIL NFR BLD AUTO: 5.5 %
ERYTHROCYTE [DISTWIDTH] IN BLOOD BY AUTOMATED COUNT: 13.1 % (ref 11.5–16)
EST. AVERAGE GLUCOSE BLD GHB EST-MCNC: 203 MG/DL (ref 68–126)
GLUCOSE BLD-MCNC: 234 MG/DL (ref 70–99)
HBA1C MFR BLD HPLC: 8.7 % (ref ?–5.7)
HCT VFR BLD AUTO: 37.9 % (ref 37–53)
HGB BLD-MCNC: 13.2 G/DL (ref 13–17)
IMMATURE GRANULOCYTE COUNT: 0.04 X10(3) UL (ref 0–1)
IMMATURE GRANULOCYTE RATIO %: 0.5 %
LYMPHOCYTES # BLD AUTO: 2.09 X10(3) UL (ref 0.9–4)
LYMPHOCYTES NFR BLD AUTO: 27.9 %
M PROTEIN MFR SERPL ELPH: 7.1 G/DL (ref 6.1–8.3)
MCH RBC QN AUTO: 32.3 PG (ref 27–33.2)
MCHC RBC AUTO-ENTMCNC: 34.8 G/DL (ref 31–37)
MCV RBC AUTO: 92.7 FL (ref 80–99)
MONOCYTES # BLD AUTO: 0.46 X10(3) UL (ref 0.1–0.6)
MONOCYTES NFR BLD AUTO: 6.1 %
NEUTROPHIL ABS PRELIM: 4.42 X10 (3) UL (ref 1.3–6.7)
NEUTROPHILS # BLD AUTO: 4.42 X10(3) UL (ref 1.3–6.7)
NEUTROPHILS NFR BLD AUTO: 58.9 %
PLATELET # BLD AUTO: 244 10(3)UL (ref 150–450)
POTASSIUM SERPL-SCNC: 3.9 MMOL/L (ref 3.6–5.1)
PREALBUMIN: 33.2 MG/DL (ref 20–40)
RBC # BLD AUTO: 4.09 X10(6)UL (ref 3.8–5.8)
RED CELL DISTRIBUTION WIDTH-SD: 43.8 FL (ref 35.1–46.3)
SODIUM SERPL-SCNC: 140 MMOL/L (ref 136–144)
WBC # BLD AUTO: 7.5 X10(3) UL (ref 4–13)

## 2018-01-25 PROCEDURE — 83036 HEMOGLOBIN GLYCOSYLATED A1C: CPT

## 2018-01-25 PROCEDURE — 80053 COMPREHEN METABOLIC PANEL: CPT

## 2018-01-25 PROCEDURE — 36415 COLL VENOUS BLD VENIPUNCTURE: CPT

## 2018-01-25 PROCEDURE — 85025 COMPLETE CBC W/AUTO DIFF WBC: CPT

## 2018-01-25 PROCEDURE — 84134 ASSAY OF PREALBUMIN: CPT

## 2018-01-26 ENCOUNTER — OFFICE VISIT (OUTPATIENT)
Dept: WOUND CARE | Age: 68
End: 2018-01-26
Attending: NURSE PRACTITIONER
Payer: MEDICARE

## 2018-01-26 DIAGNOSIS — E11.40 DIABETIC NEUROPATHY (HCC): ICD-10-CM

## 2018-01-26 DIAGNOSIS — L97.509 DM FOOT ULCER (HCC): ICD-10-CM

## 2018-01-26 DIAGNOSIS — L97.521 NON-PRESSURE CHRONIC ULCER OF OTHER PART OF LEFT FOOT LIMITED TO BREAKDOWN OF SKIN (HCC): Primary | ICD-10-CM

## 2018-01-26 DIAGNOSIS — E11.621 DM FOOT ULCER (HCC): ICD-10-CM

## 2018-01-26 PROCEDURE — 97597 DBRDMT OPN WND 1ST 20 CM/<: CPT

## 2018-01-27 NOTE — PROGRESS NOTES
Progress Note Details  Patient Name: Carli Yadav   Patient Number: [id number]  Patient YOB: 1950  Date: 1/26/2018  Physician / Mandy Flatness: Abby Blanco Poste 1: Anahy 42    Chief Complaint  This information was obtained fr Musculoskeletal: Joint Pain (OA right hip and knee)  Integumentary (Hair/Skin/Nails):  Other (left heel ulcer), Calluses/Corns (BLE)  Neurological: Loss of Protective Sensation (Pt stated has neuropathy)  Allergic/Immunologic: Rhinitis (allergic rhinitis) Wound #1 Left, Medial, Plantar Heel is an acute Coe Grade 1 Diabetic Ulcer and has received a status of Not Healed.  Subsequent wound encounter measurements are 1.1cm length x 1cm width x 0.1cm depth, with an area of 1.1 sq cm and a volume of 0.11 cubi (Encounter Diagnosis) E11.40 - Type 2 diabetes mellitus with diabetic neuropathy, unspecified  (Encounter Diagnosis) G62.9 - Polyneuropathy, unspecified  (Encounter Diagnosis) E66.8 - Other obesity  (Encounter Diagnosis) J45.909 - Unspecified asthma, uncom Reviewed and evaluated labs. - 1/25/18 CBC wnl; CMP; Prealbumin and HBA1C  11/8 (low hb and elevated neutrophils); 11/17/17 BMP;  11/6/17 HBA1C 8.1  Discussed the Plan of Care at bedside with patient.  The patient verbally acknowledges understanding of all

## 2018-02-02 ENCOUNTER — OFFICE VISIT (OUTPATIENT)
Dept: WOUND CARE | Age: 68
End: 2018-02-02
Attending: NURSE PRACTITIONER
Payer: MEDICARE

## 2018-02-02 DIAGNOSIS — L97.521 NON-PRESSURE CHRONIC ULCER OF OTHER PART OF LEFT FOOT LIMITED TO BREAKDOWN OF SKIN (HCC): Primary | ICD-10-CM

## 2018-02-02 DIAGNOSIS — L97.509 DM FOOT ULCER (HCC): ICD-10-CM

## 2018-02-02 DIAGNOSIS — E11.621 DM FOOT ULCER (HCC): ICD-10-CM

## 2018-02-02 PROCEDURE — 97597 DBRDMT OPN WND 1ST 20 CM/<: CPT

## 2018-02-02 NOTE — PROGRESS NOTES
Progress Note Details  Patient Name: Hiwot Kwon  Date: 2/2/2018   Patient Number: [id number] Physician / [place]: Army Rush   Patient YOB: 1950 Facility: [facility]    Chief Complaint  This information was obtaine 2/2/18-Pt here for management of left heel diabetic ulcer. Spouse stated that she was able to do dressing changes as ordered. Afebrile at home and no malodor to any drainage.  Denies any pain, has been offloading with felted foam donut and own diabetic shoe BP Elevated, on antihypertensive meds. . Pulse RRR. RR within normal limits. Afebrile. Obesity. Alert, calm, well developed, in no apparent distress.  Height/Length: 73 in (185.42 cm), Weight: 358 lbs (162.73 kgs), BMI: 47.2, Temperature: 97.6 °F (36.44 °C), Right Extremity: Edema is present   Compression Device In Use: No   Calf Measurement 36 cm from heel   Ankle Measurement 10 cm from heel  Vascular Assessment:  Left Extremity Pulses:   Dorsalis Pedis: Palpable  Left Extremity colors, hair growth, and condi Wound #1 Left, Medial, Plantar Heel     Topicals:  Initial Anesthetic Order: Apply lidocaine to wound bed on all future wound center visits during preparation for physician exam if wound bed contains fibrin or eschar.   4% Topical Lidocaine  Dermaplast Spra 11/6/17 HBA1C 8.1-Has f/u appointment with  (endo) on 1/30/18. Last albumin date and value: - 1/25/18 3.6 and 7.1TP, prealbumin 33.2        Pt here for left heel diabetic ulcer management. Wound has decreased in size and no s/s of infection.  Cesar Louis

## 2018-02-09 ENCOUNTER — APPOINTMENT (OUTPATIENT)
Dept: WOUND CARE | Age: 68
End: 2018-02-09
Attending: NURSE PRACTITIONER
Payer: MEDICARE

## 2018-02-16 ENCOUNTER — OFFICE VISIT (OUTPATIENT)
Dept: WOUND CARE | Age: 68
End: 2018-02-16
Attending: NURSE PRACTITIONER
Payer: MEDICARE

## 2018-02-16 DIAGNOSIS — L97.521 NON-PRESSURE CHRONIC ULCER OF OTHER PART OF LEFT FOOT LIMITED TO BREAKDOWN OF SKIN (HCC): Primary | ICD-10-CM

## 2018-02-16 PROCEDURE — 11042 DBRDMT SUBQ TIS 1ST 20SQCM/<: CPT

## 2018-02-16 NOTE — PROGRESS NOTES
Progress Note Details  Patient Name: Mao Thorne  Date: 2/16/2018   Patient Number: [id number] Physician / Keila Ndiaye: Mariann Mireles   Patient YOB: 1950 Facility: [facility]    Chief Complaint  This information was obtain 2/2/18-Pt here for management of left heel diabetic ulcer. Spouse stated that she was able to do dressing changes as ordered. Afebrile at home and no malodor to any drainage.  Denies any pain, has been offloading with felted foam donut and own diabetic shoe Psychiatric: Anxiety, Depression, Mental Illness  Prior Wound History: Bleeding, Erythema, Malodor, Pain    Additional Information  Medication reconciliation completed at today's visit. : Yes        Objective    Constitutional  BP Elevated, on antihyperten The periwound skin exhibited: Callus, Dry/Scaly, Moist. The periwound skin did not exhibit: Excoriation, Induration, Crepitus, Fluctuance, Maceration, Erythema, Hemosiderosis, Pallor, Rubor. The temperature of the periwound skin is WNL.  Periwound skin does Wound #1 (Diabetic Ulcer) is located on the left, medial, plantar heel. A skin/subcutaneous tissue level excisional/surgical debridement with a total area debrided of 0.12 sq cm was performed by Verona Diaz NP.  Subcutaneous was removed along with d Reviewed and evaluated labs. - 1/25/18 CBC wnl; CMP; Prealbumin and HBA1C  11/8 (low hb and elevated neutrophils); 11/17/17 BMP;  11/6/17 HBA1C 8.1  Discussed the Plan of Care at bedside with patient.  The patient verbally acknowledges understanding of all

## 2018-02-23 ENCOUNTER — OFFICE VISIT (OUTPATIENT)
Dept: WOUND CARE | Age: 68
End: 2018-02-23
Attending: NURSE PRACTITIONER
Payer: MEDICARE

## 2018-02-23 DIAGNOSIS — E11.621 DM FOOT ULCER (HCC): ICD-10-CM

## 2018-02-23 DIAGNOSIS — L97.509 DM FOOT ULCER (HCC): ICD-10-CM

## 2018-02-23 DIAGNOSIS — L97.521 NON-PRESSURE CHRONIC ULCER OF OTHER PART OF LEFT FOOT LIMITED TO BREAKDOWN OF SKIN (HCC): Primary | ICD-10-CM

## 2018-02-23 PROCEDURE — 97597 DBRDMT OPN WND 1ST 20 CM/<: CPT

## 2018-02-23 NOTE — PROGRESS NOTES
Progress Note Details  Patient Name: Champ Castano  Date: 2/23/2018   Patient Number: [id number] Physician / Som Gone: Melvina Benitez   Patient YOB: 1950 Facility: [facility]    Chief Complaint  This information was obtain 2/2/18-Pt here for management of left heel diabetic ulcer. Spouse stated that she was able to do dressing changes as ordered. Afebrile at home and no malodor to any drainage.  Denies any pain, has been offloading with felted foam donut and own diabetic shoe Neurological: Tingling, Tremors, Memory Loss  Hematologic/Lymphatic: Bleeding / Clotting Disorders, Enlarged Lymph Nodes  Psychiatric: Anxiety, Depression, Mental Illness  Prior Wound History: Bleeding, Erythema, Malodor, Pain    Additional Information  Me Compression Device In Use: No   Calf Measurement 36 cm from heel with left measurement of 44.5 cm   Ankle Measurement 10 cm from heel with left measurement of 25.5 cm  Right Extremity: Edema is present   Compression Device In Use: No   Calf Measurement 36 Wound Cleansing & Dressings  May shower with protection. - Shower boot  Cleanse with saline or wound cleanser  Cleanse with Vashe - Soak with vashe for 5 mins during visit  Collagen - Shaina Ag Three times a week    Thick Foam  Kerlix  Paper tape  [facility] 30 mins spent face to face with pt and more than 50% time spent on counseling about ADA diet, checking blood sugar 6 times a day as ordered by , exercise as tolerated, medication adherence and f/u with diabetic educator on ADA refresher session.  Funmi Kumar

## 2018-03-02 ENCOUNTER — OFFICE VISIT (OUTPATIENT)
Dept: WOUND CARE | Age: 68
End: 2018-03-02
Attending: NURSE PRACTITIONER
Payer: MEDICARE

## 2018-03-02 DIAGNOSIS — L97.521 NON-PRESSURE CHRONIC ULCER OF OTHER PART OF LEFT FOOT LIMITED TO BREAKDOWN OF SKIN (HCC): Primary | ICD-10-CM

## 2018-03-02 DIAGNOSIS — E11.621 DM FOOT ULCER (HCC): ICD-10-CM

## 2018-03-02 DIAGNOSIS — L97.509 DM FOOT ULCER (HCC): ICD-10-CM

## 2018-03-02 PROCEDURE — 97597 DBRDMT OPN WND 1ST 20 CM/<: CPT

## 2018-03-02 NOTE — PROGRESS NOTES
Progress Note Details  Patient Name: Abdirahman Kramer  Date: 3/2/2018   Patient Number: [id number] Physician / Pema Ear: Bhargavi Mora   Patient YOB: 1950 Facility: [facility]    Chief Complaint  This information was obtaine 2/2/18-Pt here for management of left heel diabetic ulcer. Spouse stated that she was able to do dressing changes as ordered. Afebrile at home and no malodor to any drainage.  Denies any pain, has been offloading with felted foam donut and own diabetic shoe Gastrointestinal (GI): Change in Bowel Habits, Nausea / Vomiting / Diarrhea (N/V/D), Difficulty Swallowing, Stomach/abdominal pain  Musculoskeletal: Assistive Devices  Integumentary (Hair/Skin/Nails): Rash, Itching, Hemosiderin Staining  Neurological: Kika Woody The periwound skin exhibited: Callus, Dry/Scaly. The periwound skin did not exhibit: Edema, Excoriation, Induration, Crepitus, Fluctuance, Moist, Maceration, Erythema, Hemosiderosis, Pallor, Rubor. The temperature of the periwound skin is WNL.  Periwound sk Initial Anesthetic Order: Apply lidocaine to wound bed on all future wound center visits during preparation for physician exam if wound bed contains fibrin or eschar.   4% Topical Lidocaine  Dermaplast Spray    Wound Cleansing & Dressings  May shower with p 11/6/17 HBA1C 8.1-Has f/u appointment with  (endo) on 1/30/18. Last albumin date and value: - 1/25/18 3.6 and 7.1TP, prealbumin 33.2        70 yo male here for left heel diabetic ulcer management. Wound decreased in volume and no s/s of infection.

## 2018-03-09 ENCOUNTER — OFFICE VISIT (OUTPATIENT)
Dept: WOUND CARE | Age: 68
End: 2018-03-09
Attending: NURSE PRACTITIONER
Payer: MEDICARE

## 2018-03-09 DIAGNOSIS — L97.521 NON-PRESSURE CHRONIC ULCER OF OTHER PART OF LEFT FOOT LIMITED TO BREAKDOWN OF SKIN (HCC): Primary | ICD-10-CM

## 2018-03-09 DIAGNOSIS — E11.621 DM FOOT ULCER (HCC): ICD-10-CM

## 2018-03-09 DIAGNOSIS — L97.509 DM FOOT ULCER (HCC): ICD-10-CM

## 2018-03-09 PROCEDURE — 97597 DBRDMT OPN WND 1ST 20 CM/<: CPT

## 2018-03-09 NOTE — PROGRESS NOTES
Progress Note Details  Patient Name: Yue Laughlin  Date: 3/9/2018   Patient Number: [id number] Physician / Leelee Waldronee: Babak Teran   Patient YOB: 1950 Facility: [facility]    Chief Complaint  This information was obtaine 2/2/18-Pt here for management of left heel diabetic ulcer. Spouse stated that she was able to do dressing changes as ordered. Afebrile at home and no malodor to any drainage.  Denies any pain, has been offloading with felted foam donut and own diabetic shoe Constitutional Symptoms (General Health):  Fatigue, Fever, Marked Weight Change, Chills  Eyes: Vision Changes, Glasses / Contacts  Respiratory: Cough, Shortness of Breath  Cardiovascular (Central/Peripheral): Palpitations, Diaphoresis, Dyspnea on Exertion, Wound #1 Left, Medial, Plantar Heel is an acute Coe Grade 1 Diabetic Ulcer and has received a status of Not Healed.  Subsequent wound encounter measurements are 0.2cm length x 0.4cm width x 0.2cm depth, with an area of 0.08 sq cm and a volume of 0.016 cu Wound #1 (Diabetic Ulcer) is located on the left, medial, plantar heel. A selective debridement with a total area debrided of 0.08 sq cm was performed by Cheryl Green NP. to remove devitalized tissue: biofilm, callus, fibrin, and slough.  The followi Reviewed and evaluated labs. - 1/25/18 CBC wnl; CMP; Prealbumin and HBA1C  11/8 (low hb and elevated neutrophils); 11/17/17 BMP;  11/6/17 HBA1C 8.1  Discussed the Plan of Care at bedside with patient.  The patient verbally acknowledges understanding of all

## 2018-03-16 ENCOUNTER — OFFICE VISIT (OUTPATIENT)
Dept: WOUND CARE | Age: 68
End: 2018-03-16
Attending: NURSE PRACTITIONER
Payer: MEDICARE

## 2018-03-16 DIAGNOSIS — E11.40 DIABETIC NEUROPATHY (HCC): ICD-10-CM

## 2018-03-16 DIAGNOSIS — L97.509 DM FOOT ULCER (HCC): ICD-10-CM

## 2018-03-16 DIAGNOSIS — L97.521 NON-PRESSURE CHRONIC ULCER OF OTHER PART OF LEFT FOOT LIMITED TO BREAKDOWN OF SKIN (HCC): Primary | ICD-10-CM

## 2018-03-16 DIAGNOSIS — E11.621 DM FOOT ULCER (HCC): ICD-10-CM

## 2018-03-16 PROCEDURE — 97597 DBRDMT OPN WND 1ST 20 CM/<: CPT

## 2018-03-16 NOTE — PROGRESS NOTES
Progress Note Details  Patient Name: Belkis Melara  Date: 3/16/2018   Patient Number: [id number] Physician / Narcisa Pearl: Andreina Israel   Patient YOB: 1950 Facility: [facility]    Chief Complaint  This information was obtain 2/2/18-Pt here for management of left heel diabetic ulcer. Spouse stated that she was able to do dressing changes as ordered. Afebrile at home and no malodor to any drainage.  Denies any pain, has been offloading with felted foam donut and own diabetic shoe Musculoskeletal: Joint Pain (OA right hip and knee)  Integumentary (Hair/Skin/Nails):  Other (left heel ulcer), Calluses/Corns (BLE)  Neurological: Loss of Protective Sensation (Pt stated has neuropathy-has own diabetic shoes with inserts.)  Allergic/Immuno Wound #1 Left, Medial, Plantar Heel is an acute Coe Grade 1 Diabetic Ulcer and has received a status of Not Healed.  Subsequent wound encounter measurements are 0.2cm length x 0.4cm width x 0.2cm depth, with an area of 0.08 sq cm and a volume of 0.016 cu Wound #1 (Diabetic Ulcer) is located on the left, medial, plantar heel. A selective debridement with a total area debrided of 0.08 sq cm was performed by Cheryl Green NP. to remove devitalized tissue: biofilm, callus, and slough.  The following instr Reviewed and evaluated labs. - 1/25/18 CBC wnl; CMP; Prealbumin and HBA1C  11/8 (low hb and elevated neutrophils); 11/17/17 BMP;  11/6/17 HBA1C 8.1  Discussed the Plan of Care at bedside with patient.  The patient verbally acknowledges understanding of all

## 2018-03-23 ENCOUNTER — APPOINTMENT (OUTPATIENT)
Dept: GENERAL RADIOLOGY | Age: 68
DRG: 309 | End: 2018-03-23
Attending: EMERGENCY MEDICINE
Payer: MEDICARE

## 2018-03-23 ENCOUNTER — HOSPITAL ENCOUNTER (INPATIENT)
Facility: HOSPITAL | Age: 68
LOS: 1 days | Discharge: HOME | DRG: 309 | End: 2018-03-25
Attending: EMERGENCY MEDICINE | Admitting: INTERNAL MEDICINE
Payer: MEDICARE

## 2018-03-23 ENCOUNTER — OFFICE VISIT (OUTPATIENT)
Dept: WOUND CARE | Age: 68
End: 2018-03-23
Attending: NURSE PRACTITIONER
Payer: MEDICARE

## 2018-03-23 DIAGNOSIS — R42 LIGHT HEADED: ICD-10-CM

## 2018-03-23 DIAGNOSIS — I10 ESSENTIAL HYPERTENSION: ICD-10-CM

## 2018-03-23 DIAGNOSIS — J01.90 ACUTE SINUSITIS, UNSPECIFIED: ICD-10-CM

## 2018-03-23 DIAGNOSIS — R00.1 SYMPTOMATIC BRADYCARDIA: Primary | ICD-10-CM

## 2018-03-23 DIAGNOSIS — T14.8XXA NONHEALING NONSURGICAL WOUND: Primary | ICD-10-CM

## 2018-03-23 PROBLEM — R79.89 AZOTEMIA: Status: ACTIVE | Noted: 2018-03-23

## 2018-03-23 LAB
ALBUMIN SERPL-MCNC: 3.8 G/DL (ref 3.5–4.8)
ALP LIVER SERPL-CCNC: 90 U/L (ref 45–117)
ALT SERPL-CCNC: 34 U/L (ref 17–63)
APTT PPP: 27.5 SECONDS (ref 25–34)
AST SERPL-CCNC: 31 U/L (ref 15–41)
BASOPHILS # BLD AUTO: 0.07 X10(3) UL (ref 0–0.1)
BASOPHILS NFR BLD AUTO: 0.7 %
BILIRUB SERPL-MCNC: 0.3 MG/DL (ref 0.1–2)
BUN BLD-MCNC: 29 MG/DL (ref 8–20)
CALCIUM BLD-MCNC: 9 MG/DL (ref 8.3–10.3)
CHLORIDE: 107 MMOL/L (ref 101–111)
CO2: 25 MMOL/L (ref 22–32)
CREAT BLD-MCNC: 1.1 MG/DL (ref 0.7–1.3)
EOSINOPHIL # BLD AUTO: 0.36 X10(3) UL (ref 0–0.3)
EOSINOPHIL NFR BLD AUTO: 3.7 %
ERYTHROCYTE [DISTWIDTH] IN BLOOD BY AUTOMATED COUNT: 13.9 % (ref 11.5–16)
GLUCOSE BLD-MCNC: 122 MG/DL (ref 70–99)
HCT VFR BLD AUTO: 40.4 % (ref 37–53)
HGB BLD-MCNC: 13.9 G/DL (ref 13–17)
IMMATURE GRANULOCYTE COUNT: 0.03 X10(3) UL (ref 0–1)
IMMATURE GRANULOCYTE RATIO %: 0.3 %
INR BLD: 1.1 (ref 0.89–1.12)
LYMPHOCYTES # BLD AUTO: 2.6 X10(3) UL (ref 0.9–4)
LYMPHOCYTES NFR BLD AUTO: 26.8 %
M PROTEIN MFR SERPL ELPH: 7.5 G/DL (ref 6.1–8.3)
MCH RBC QN AUTO: 32.6 PG (ref 27–33.2)
MCHC RBC AUTO-ENTMCNC: 34.4 G/DL (ref 31–37)
MCV RBC AUTO: 94.6 FL (ref 80–99)
MONOCYTES # BLD AUTO: 0.56 X10(3) UL (ref 0.1–1)
MONOCYTES NFR BLD AUTO: 5.8 %
NEUTROPHIL ABS PRELIM: 6.07 X10 (3) UL (ref 1.3–6.7)
NEUTROPHILS # BLD AUTO: 6.07 X10(3) UL (ref 1.3–6.7)
NEUTROPHILS NFR BLD AUTO: 62.7 %
PLATELET # BLD AUTO: 224 10(3)UL (ref 150–450)
POTASSIUM SERPL-SCNC: 4.1 MMOL/L (ref 3.6–5.1)
PRO-BETA NATRIURETIC PEPTIDE: 455 PG/ML (ref ?–125)
PSA SERPL DL<=0.01 NG/ML-MCNC: 13.9 SECONDS (ref 11.8–14.1)
RBC # BLD AUTO: 4.27 X10(6)UL (ref 3.8–5.8)
RED CELL DISTRIBUTION WIDTH-SD: 47.8 FL (ref 35.1–46.3)
SODIUM SERPL-SCNC: 142 MMOL/L (ref 136–144)
TROPONIN: <0.046 NG/ML (ref ?–0.05)
WBC # BLD AUTO: 9.7 X10(3) UL (ref 4–13)

## 2018-03-23 PROCEDURE — 85025 COMPLETE CBC W/AUTO DIFF WBC: CPT | Performed by: EMERGENCY MEDICINE

## 2018-03-23 PROCEDURE — 87070 CULTURE OTHR SPECIMN AEROBIC: CPT

## 2018-03-23 PROCEDURE — 87077 CULTURE AEROBIC IDENTIFY: CPT

## 2018-03-23 PROCEDURE — 97597 DBRDMT OPN WND 1ST 20 CM/<: CPT

## 2018-03-23 PROCEDURE — 87186 SC STD MICRODIL/AGAR DIL: CPT

## 2018-03-23 PROCEDURE — 80053 COMPREHEN METABOLIC PANEL: CPT | Performed by: EMERGENCY MEDICINE

## 2018-03-23 PROCEDURE — 87205 SMEAR GRAM STAIN: CPT

## 2018-03-23 PROCEDURE — 83880 ASSAY OF NATRIURETIC PEPTIDE: CPT | Performed by: EMERGENCY MEDICINE

## 2018-03-23 PROCEDURE — 93010 ELECTROCARDIOGRAM REPORT: CPT

## 2018-03-23 PROCEDURE — 84484 ASSAY OF TROPONIN QUANT: CPT | Performed by: EMERGENCY MEDICINE

## 2018-03-23 PROCEDURE — 99285 EMERGENCY DEPT VISIT HI MDM: CPT

## 2018-03-23 PROCEDURE — 93005 ELECTROCARDIOGRAM TRACING: CPT

## 2018-03-23 PROCEDURE — 85610 PROTHROMBIN TIME: CPT | Performed by: EMERGENCY MEDICINE

## 2018-03-23 PROCEDURE — 96360 HYDRATION IV INFUSION INIT: CPT

## 2018-03-23 PROCEDURE — 71045 X-RAY EXAM CHEST 1 VIEW: CPT | Performed by: EMERGENCY MEDICINE

## 2018-03-23 PROCEDURE — 85730 THROMBOPLASTIN TIME PARTIAL: CPT | Performed by: EMERGENCY MEDICINE

## 2018-03-23 RX ORDER — SODIUM CHLORIDE 9 MG/ML
INJECTION, SOLUTION INTRAVENOUS ONCE
Status: COMPLETED | OUTPATIENT
Start: 2018-03-23 | End: 2018-03-23

## 2018-03-23 NOTE — PROGRESS NOTES
Progress Note Details  Patient Name: Ravin Morrison  Date: 3/23/2018   Patient Number: [id number] Physician / Goran Garner: Lisa Casanova   Patient YOB: 1950 Facility: [facility]    Chief Complaint  This information was obtain 2/2/18-Pt here for management of left heel diabetic ulcer. Spouse stated that she was able to do dressing changes as ordered. Afebrile at home and no malodor to any drainage.  Denies any pain, has been offloading with felted foam donut and own diabetic shoe 3/23/18-here for left foot DFU management. Afebrile. Spouse has been doing dressing changes. Adhering to ADA diet and monitoring BS levels. Reports mild pain this visit. Has been on his feet a lot past week.     Complaints and Symptoms  This information was Normal respiratory effort ,without use of accessory muscles. Cardiovascular:  Pedro +2 pedal pulses. Left heel DFU. Musculoskeletal:  Steady gait . Psychiatric:  Intact judgement and insight. Approptiate affect.      Integumentary (Hair, Skin)  Wo Wound #1 (Diabetic Ulcer) is located on the left, medial, plantar heel. A selective debridement with a total area debrided of 0.56 sq cm was performed by Jamie Carl NP. to remove devitalized tissue: biofilm, callus, fibrin, and slough.  The followi Additional Orders:    Care summary  Reviewed and evaluated labs. - 1/25/18 CBC wnl; CMP; Prealbumin and HBA1C  11/8 (low hb and elevated neutrophils); 11/17/17 BMP;  11/6/17 HBA1C 8.1  Discussed the Plan of Care at bedside with patient.  The patient verball Electronic Signature(s)   Signed By:  Date:    Vu EDWARDS, FNP-BC 03/23/2018 09:58:52   Entered By: Vu Prado on 03/23/2018 09:58:30

## 2018-03-24 ENCOUNTER — APPOINTMENT (OUTPATIENT)
Dept: CV DIAGNOSTICS | Facility: HOSPITAL | Age: 68
DRG: 309 | End: 2018-03-24
Attending: INTERNAL MEDICINE
Payer: MEDICARE

## 2018-03-24 PROBLEM — R42 LIGHT HEADED: Status: ACTIVE | Noted: 2018-03-24

## 2018-03-24 LAB
ATRIAL RATE: 55 BPM
ATRIAL RATE: 61 BPM
BASOPHILS # BLD AUTO: 0.07 X10(3) UL (ref 0–0.1)
BASOPHILS NFR BLD AUTO: 0.7 %
BUN BLD-MCNC: 29 MG/DL (ref 8–20)
CALCIUM BLD-MCNC: 8.9 MG/DL (ref 8.3–10.3)
CHLORIDE: 108 MMOL/L (ref 101–111)
CO2: 25 MMOL/L (ref 22–32)
CREAT BLD-MCNC: 1.06 MG/DL (ref 0.7–1.3)
EOSINOPHIL # BLD AUTO: 0.26 X10(3) UL (ref 0–0.3)
EOSINOPHIL NFR BLD AUTO: 2.7 %
ERYTHROCYTE [DISTWIDTH] IN BLOOD BY AUTOMATED COUNT: 13.6 % (ref 11.5–16)
GLUCOSE BLD-MCNC: 111 MG/DL (ref 65–99)
GLUCOSE BLD-MCNC: 113 MG/DL (ref 65–99)
GLUCOSE BLD-MCNC: 118 MG/DL (ref 65–99)
GLUCOSE BLD-MCNC: 126 MG/DL (ref 70–99)
GLUCOSE BLD-MCNC: 128 MG/DL (ref 65–99)
GLUCOSE BLD-MCNC: 154 MG/DL (ref 65–99)
GLUCOSE BLD-MCNC: 159 MG/DL (ref 65–99)
HCT VFR BLD AUTO: 37.2 % (ref 37–53)
HGB BLD-MCNC: 12.7 G/DL (ref 13–17)
IMMATURE GRANULOCYTE COUNT: 0.02 X10(3) UL (ref 0–1)
IMMATURE GRANULOCYTE RATIO %: 0.2 %
INR BLD: 0.98 (ref 0.9–1.1)
LYMPHOCYTES # BLD AUTO: 2.3 X10(3) UL (ref 0.9–4)
LYMPHOCYTES NFR BLD AUTO: 23.5 %
MCH RBC QN AUTO: 32.5 PG (ref 27–33.2)
MCHC RBC AUTO-ENTMCNC: 34.1 G/DL (ref 31–37)
MCV RBC AUTO: 95.1 FL (ref 80–99)
MONOCYTES # BLD AUTO: 0.61 X10(3) UL (ref 0.1–1)
MONOCYTES NFR BLD AUTO: 6.2 %
NEUTROPHIL ABS PRELIM: 6.52 X10 (3) UL (ref 1.3–6.7)
NEUTROPHILS # BLD AUTO: 6.52 X10(3) UL (ref 1.3–6.7)
NEUTROPHILS NFR BLD AUTO: 66.7 %
P AXIS: 62 DEGREES
PLATELET # BLD AUTO: 212 10(3)UL (ref 150–450)
POTASSIUM SERPL-SCNC: 3.8 MMOL/L (ref 3.6–5.1)
PSA SERPL DL<=0.01 NG/ML-MCNC: 13.4 SECONDS (ref 12.4–14.7)
Q-T INTERVAL: 444 MS
Q-T INTERVAL: 466 MS
QRS DURATION: 110 MS
QRS DURATION: 120 MS
QTC CALCULATION (BEZET): 398 MS
QTC CALCULATION (BEZET): 469 MS
R AXIS: -52 DEGREES
R AXIS: -59 DEGREES
RBC # BLD AUTO: 3.91 X10(6)UL (ref 3.8–5.8)
RED CELL DISTRIBUTION WIDTH-SD: 47 FL (ref 35.1–46.3)
SODIUM SERPL-SCNC: 143 MMOL/L (ref 136–144)
T AXIS: -2 DEGREES
T AXIS: 54 DEGREES
TROPONIN: <0.046 NG/ML (ref ?–0.05)
VENTRICULAR RATE: 44 BPM
VENTRICULAR RATE: 67 BPM
WBC # BLD AUTO: 9.8 X10(3) UL (ref 4–13)

## 2018-03-24 PROCEDURE — 84484 ASSAY OF TROPONIN QUANT: CPT | Performed by: HOSPITALIST

## 2018-03-24 PROCEDURE — 93306 TTE W/DOPPLER COMPLETE: CPT | Performed by: INTERNAL MEDICINE

## 2018-03-24 PROCEDURE — 93005 ELECTROCARDIOGRAM TRACING: CPT

## 2018-03-24 PROCEDURE — 93010 ELECTROCARDIOGRAM REPORT: CPT | Performed by: INTERNAL MEDICINE

## 2018-03-24 PROCEDURE — 85610 PROTHROMBIN TIME: CPT | Performed by: HOSPITALIST

## 2018-03-24 PROCEDURE — 80048 BASIC METABOLIC PNL TOTAL CA: CPT | Performed by: HOSPITALIST

## 2018-03-24 PROCEDURE — 85025 COMPLETE CBC W/AUTO DIFF WBC: CPT | Performed by: HOSPITALIST

## 2018-03-24 PROCEDURE — 82962 GLUCOSE BLOOD TEST: CPT

## 2018-03-24 PROCEDURE — 94660 CPAP INITIATION&MGMT: CPT

## 2018-03-24 RX ORDER — ATORVASTATIN CALCIUM 10 MG/1
10 TABLET, FILM COATED ORAL NIGHTLY
Status: DISCONTINUED | OUTPATIENT
Start: 2018-03-24 | End: 2018-03-25

## 2018-03-24 RX ORDER — OLMESARTAN MEDOXOMIL AND HYDROCHLOROTHIAZIDE 40/25 40; 25 MG/1; MG/1
1 TABLET ORAL
Status: DISCONTINUED | OUTPATIENT
Start: 2018-03-24 | End: 2018-03-24

## 2018-03-24 RX ORDER — SPIRONOLACTONE 25 MG/1
50 TABLET ORAL DAILY
Status: DISCONTINUED | OUTPATIENT
Start: 2018-03-24 | End: 2018-03-25

## 2018-03-24 RX ORDER — ALLOPURINOL 300 MG/1
300 TABLET ORAL DAILY
Status: DISCONTINUED | OUTPATIENT
Start: 2018-03-24 | End: 2018-03-25

## 2018-03-24 RX ORDER — HEPARIN SODIUM 5000 [USP'U]/ML
5000 INJECTION, SOLUTION INTRAVENOUS; SUBCUTANEOUS EVERY 12 HOURS SCHEDULED
Status: DISCONTINUED | OUTPATIENT
Start: 2018-03-24 | End: 2018-03-25

## 2018-03-24 RX ORDER — AZELASTINE 1 MG/ML
1-2 SPRAY, METERED NASAL 2 TIMES DAILY
Status: DISCONTINUED | OUTPATIENT
Start: 2018-03-24 | End: 2018-03-25

## 2018-03-24 RX ORDER — CALCIUM CARBONATE 200(500)MG
1000 TABLET,CHEWABLE ORAL 3 TIMES DAILY PRN
Status: DISCONTINUED | OUTPATIENT
Start: 2018-03-24 | End: 2018-03-25

## 2018-03-24 RX ORDER — ALBUTEROL SULFATE 90 UG/1
2 AEROSOL, METERED RESPIRATORY (INHALATION) EVERY 4 HOURS PRN
Status: DISCONTINUED | OUTPATIENT
Start: 2018-03-24 | End: 2018-03-25

## 2018-03-24 RX ORDER — TERAZOSIN 1 MG/1
1 CAPSULE ORAL NIGHTLY
Status: DISCONTINUED | OUTPATIENT
Start: 2018-03-24 | End: 2018-03-25

## 2018-03-24 RX ORDER — PANTOPRAZOLE SODIUM 20 MG/1
20 TABLET, DELAYED RELEASE ORAL
Status: DISCONTINUED | OUTPATIENT
Start: 2018-03-24 | End: 2018-03-25

## 2018-03-24 RX ORDER — SODIUM CHLORIDE 9 MG/ML
INJECTION, SOLUTION INTRAVENOUS CONTINUOUS
Status: ACTIVE | OUTPATIENT
Start: 2018-03-24 | End: 2018-03-24

## 2018-03-24 RX ORDER — HYDRALAZINE HYDROCHLORIDE 50 MG/1
100 TABLET, FILM COATED ORAL 3 TIMES DAILY
Status: DISCONTINUED | OUTPATIENT
Start: 2018-03-24 | End: 2018-03-25

## 2018-03-24 RX ORDER — COLCHICINE 0.6 MG/1
0.6 TABLET ORAL
Status: DISCONTINUED | OUTPATIENT
Start: 2018-03-24 | End: 2018-03-25

## 2018-03-24 RX ORDER — ONDANSETRON 2 MG/ML
4 INJECTION INTRAMUSCULAR; INTRAVENOUS EVERY 6 HOURS PRN
Status: DISCONTINUED | OUTPATIENT
Start: 2018-03-24 | End: 2018-03-25

## 2018-03-24 RX ORDER — ONDANSETRON 2 MG/ML
4 INJECTION INTRAMUSCULAR; INTRAVENOUS EVERY 4 HOURS PRN
Status: DISCONTINUED | OUTPATIENT
Start: 2018-03-24 | End: 2018-03-24

## 2018-03-24 RX ORDER — DEXTROSE MONOHYDRATE 25 G/50ML
50 INJECTION, SOLUTION INTRAVENOUS
Status: DISCONTINUED | OUTPATIENT
Start: 2018-03-24 | End: 2018-03-25

## 2018-03-24 RX ORDER — PREGABALIN 100 MG/1
100 CAPSULE ORAL NIGHTLY
Status: DISCONTINUED | OUTPATIENT
Start: 2018-03-24 | End: 2018-03-25

## 2018-03-24 RX ORDER — POTASSIUM CHLORIDE 20 MEQ/1
40 TABLET, EXTENDED RELEASE ORAL ONCE
Status: COMPLETED | OUTPATIENT
Start: 2018-03-24 | End: 2018-03-24

## 2018-03-24 RX ORDER — VALSARTAN 320 MG/1
320 TABLET ORAL DAILY
Status: DISCONTINUED | OUTPATIENT
Start: 2018-03-24 | End: 2018-03-25

## 2018-03-24 RX ORDER — SERTRALINE HYDROCHLORIDE 100 MG/1
100 TABLET, FILM COATED ORAL DAILY
Status: DISCONTINUED | OUTPATIENT
Start: 2018-03-24 | End: 2018-03-25

## 2018-03-24 RX ORDER — ACETAMINOPHEN 325 MG/1
650 TABLET ORAL EVERY 6 HOURS PRN
Status: DISCONTINUED | OUTPATIENT
Start: 2018-03-24 | End: 2018-03-25

## 2018-03-24 RX ORDER — AMLODIPINE BESYLATE 5 MG/1
10 TABLET ORAL DAILY
Status: DISCONTINUED | OUTPATIENT
Start: 2018-03-24 | End: 2018-03-25

## 2018-03-24 RX ORDER — HYDROCHLOROTHIAZIDE 25 MG/1
50 TABLET ORAL DAILY
Status: DISCONTINUED | OUTPATIENT
Start: 2018-03-24 | End: 2018-03-25

## 2018-03-24 NOTE — ED PROVIDER NOTES
Patient Seen in: [facility]    History   Patient presents with:  Arrythmia/Palpitations (cardiovascular)    Stated Complaint: states HR was 44 with [facility] RN check today    HPI    The patient is a 38-year-old ma Comment: right knee         Smoking status: Never Smoker                                                              Smokeless tobacco: Never Used                      Alcohol use:  No                Review of Systems    Positive for stated complaint: stat erythema to suggest infection. NEURO: Patient is awake, alert and oriented to time place and person. Motor strength is 5 over 5 in all 4 extremities. There are no gross motor or sensory deficits appreciated. Cranial nerves II through XII are intact.   Fatemeh 2246  ------------------------------------------------------------       MDM   Xr Chest Ap Portable  (cpt=71045)    Result Date: 3/23/2018  CONCLUSION:  Mild cardiomegaly.   Overall stable appearance of the chest.  If clinical symptoms persist consider foll pm    Follow-up:  No follow-up provider specified.       Medications Prescribed:  Current Discharge Medication List        Present on Admission  Date Reviewed: 11/14/2017          ICD-10-CM Noted POA    Azotemia R79.89 3/23/2018 Yes    Symptomatic bradycard

## 2018-03-24 NOTE — RESPIRATORY THERAPY NOTE
LATESHA - Equipment Use Daily Summary:  · Set Mode BIFLEX  · Usage in hours: 1  · 90% Pressure (EPAP) level: 19  · 90% Insp Pressure (IPAP): 25  · AHI: 2.8  · Supplemental Oxygen:  · Comments:

## 2018-03-24 NOTE — PLAN OF CARE
NURSING ADMISSION NOTE      Patient admitted via Ambulance  Oriented to room. Safety precautions initiated. Bed in low position. Call light in reach. Admission navigator completed. Pt AOx4. On RA, denies SOB. Wears Bipap @ night.  SR/SB with inte

## 2018-03-24 NOTE — CM/SW NOTE
sw spoke to pt regarding current [facility] nurse. Pt states this is a RN that made one visit from his [facility] insurance. They make one visit per year. sw offered additional visits from a [facility] agency. Pt unsure if he will need that.  SW to follow up on [facility] prior to disc

## 2018-03-24 NOTE — PLAN OF CARE
Diabetes/Glucose Control    • Glucose maintained within prescribed range Progressing        Patient/Family Goals    • Patient/Family Long Term Goal Progressing    • Patient/Family Short Term Goal Progressing            Received patient this am in stable co

## 2018-03-24 NOTE — PROGRESS NOTES
03/24/18 0017 03/24/18 0019 03/24/18 0021   Vital Signs   Pulse (!) 38 55 69   Heart Rate Source Monitor Monitor Monitor   Resp 20 20 20   Respiratory Quality Normal Normal Normal   /52 157/53 (!) 167/59   BP Location Right arm Right arm Right arm

## 2018-03-24 NOTE — CONSULTS
[facility] Cardiology Consultation Naveen Russo MD    The patient was interviewed, examined, the chart was reviewed and the consult was dictated. This is a 69year old male with a chief complaint of low heart rate. Impression:  1.   Second-degree AV block t

## 2018-03-24 NOTE — H&P
[facility] Hospitalist H&P       CC: Patient presents with:  Arrythmia/Palpitations (cardiovascular)       PCP: Kvng Kauffman MD    History of Present Illness:  Mr. Yahaira Bazan is a 70 yo male with PMH of bladder cancer (1991), HTN, HLD, LATESHA (on BiPAP), recent capsule Rfl: 3   Esomeprazole Magnesium (NEXIUM) 40 MG Oral Capsule Delayed Release Take 1 capsule (40 mg total) by mouth 2 (two) times daily.  Disp: 180 capsule Rfl: 3   HydrALAZINE HCl 100 MG Oral Tab Take 1 tablet (100 mg total) by mouth 3 (three) times • Hypertension Father    • Lipids Father    • Heart Disorder Mother    • Diabetes Mother    • Hypertension Mother    • Lipids Sister    • Hypertension Sister        Review of Systems  12 point ROS negative, except as stated in HPI    OBJECTIVE:  /5 (cpt=71045)    Result Date: 3/23/2018  PROCEDURE:  XR CHEST AP PORTABLE  (CPT=71045)  TECHNIQUE:  AP chest radiograph was obtained. COMPARISON:  [facility], XR CHEST PA + LAT CHEST (CPT=71020), 6/09/2017, 10:48.   [facility], XR CHEST AP PORTABLE  (CPT=710 work-up    Outpatient records reviewed confirming patient's medical history and medications.      Elena Summers  Internal Medicine  [facility] Hospitalist  Pager: [phone]      **Certification      PHYSICIAN Certification of Need for Inpatient Hospitalization

## 2018-03-24 NOTE — CONSULTS
[facility]    PATIENT'S NAME: Rubina Jese   ATTENDING PHYSICIAN: Yael Cano M.D.   CONSULTING PHYSICIAN: Tr Amanda M.D.    PATIENT ACCOUNT#:   [de-identified]    LOCATION:  [address] [place] [address]  MEDICAL RECORD #:   [id number]       DATE OF BI remains asymptomatic. He denies chest pain, shortness of breath, lightheadedness, presyncope, or syncope. The patient's wound on his foot is healing. He has been up and walking. The patient has obesity and sleep apnea but wears a CPAP mask.     PAST MED blood pressure medications. Attempt to control blood pressure and improve heart rate. The patient has moderate criteria for permanent pacemaker. We will check echo to see LV function. He may need PCD if device placed.     Dictated By Chadwick Marie M.D

## 2018-03-25 VITALS
OXYGEN SATURATION: 95 % | BODY MASS INDEX: 41.75 KG/M2 | DIASTOLIC BLOOD PRESSURE: 57 MMHG | SYSTOLIC BLOOD PRESSURE: 157 MMHG | TEMPERATURE: 99 F | HEIGHT: 73 IN | WEIGHT: 315 LBS | HEART RATE: 51 BPM | RESPIRATION RATE: 20 BRPM

## 2018-03-25 LAB
ATRIAL RATE: 46 BPM
BASOPHILS # BLD AUTO: 0.04 X10(3) UL (ref 0–0.1)
BASOPHILS NFR BLD AUTO: 0.5 %
BUN BLD-MCNC: 26 MG/DL (ref 8–20)
CALCIUM BLD-MCNC: 9.3 MG/DL (ref 8.3–10.3)
CHLORIDE: 105 MMOL/L (ref 101–111)
CO2: 28 MMOL/L (ref 22–32)
CREAT BLD-MCNC: 1 MG/DL (ref 0.7–1.3)
EOSINOPHIL # BLD AUTO: 0.35 X10(3) UL (ref 0–0.3)
EOSINOPHIL NFR BLD AUTO: 4.3 %
ERYTHROCYTE [DISTWIDTH] IN BLOOD BY AUTOMATED COUNT: 13.6 % (ref 11.5–16)
GLUCOSE BLD-MCNC: 121 MG/DL (ref 65–99)
GLUCOSE BLD-MCNC: 124 MG/DL (ref 70–99)
GLUCOSE BLD-MCNC: 159 MG/DL (ref 65–99)
HAV IGM SER QL: 1.8 MG/DL (ref 1.7–3)
HCT VFR BLD AUTO: 38.7 % (ref 37–53)
HGB BLD-MCNC: 13.2 G/DL (ref 13–17)
IMMATURE GRANULOCYTE COUNT: 0.02 X10(3) UL (ref 0–1)
IMMATURE GRANULOCYTE RATIO %: 0.2 %
LYMPHOCYTES # BLD AUTO: 2.33 X10(3) UL (ref 0.9–4)
LYMPHOCYTES NFR BLD AUTO: 28.8 %
MCH RBC QN AUTO: 32.5 PG (ref 27–33.2)
MCHC RBC AUTO-ENTMCNC: 34.1 G/DL (ref 31–37)
MCV RBC AUTO: 95.3 FL (ref 80–99)
MONOCYTES # BLD AUTO: 0.63 X10(3) UL (ref 0.1–1)
MONOCYTES NFR BLD AUTO: 7.8 %
NEUTROPHIL ABS PRELIM: 4.71 X10 (3) UL (ref 1.3–6.7)
NEUTROPHILS # BLD AUTO: 4.71 X10(3) UL (ref 1.3–6.7)
NEUTROPHILS NFR BLD AUTO: 58.4 %
P AXIS: 59 DEGREES
P-R INTERVAL: 280 MS
PLATELET # BLD AUTO: 212 10(3)UL (ref 150–450)
POTASSIUM SERPL-SCNC: 3.7 MMOL/L (ref 3.6–5.1)
POTASSIUM SERPL-SCNC: 3.7 MMOL/L (ref 3.6–5.1)
Q-T INTERVAL: 476 MS
QRS DURATION: 122 MS
QTC CALCULATION (BEZET): 416 MS
R AXIS: -49 DEGREES
RBC # BLD AUTO: 4.06 X10(6)UL (ref 3.8–5.8)
RED CELL DISTRIBUTION WIDTH-SD: 47.1 FL (ref 35.1–46.3)
SODIUM SERPL-SCNC: 141 MMOL/L (ref 136–144)
T AXIS: -24 DEGREES
VENTRICULAR RATE: 46 BPM
WBC # BLD AUTO: 8.1 X10(3) UL (ref 4–13)

## 2018-03-25 PROCEDURE — 93010 ELECTROCARDIOGRAM REPORT: CPT | Performed by: INTERNAL MEDICINE

## 2018-03-25 PROCEDURE — 93005 ELECTROCARDIOGRAM TRACING: CPT

## 2018-03-25 PROCEDURE — 85025 COMPLETE CBC W/AUTO DIFF WBC: CPT | Performed by: INTERNAL MEDICINE

## 2018-03-25 PROCEDURE — 84132 ASSAY OF SERUM POTASSIUM: CPT | Performed by: INTERNAL MEDICINE

## 2018-03-25 PROCEDURE — 82962 GLUCOSE BLOOD TEST: CPT

## 2018-03-25 PROCEDURE — 83735 ASSAY OF MAGNESIUM: CPT | Performed by: INTERNAL MEDICINE

## 2018-03-25 PROCEDURE — 80048 BASIC METABOLIC PNL TOTAL CA: CPT | Performed by: INTERNAL MEDICINE

## 2018-03-25 RX ORDER — MAGNESIUM OXIDE 400 MG (241.3 MG MAGNESIUM) TABLET
400 TABLET ONCE
Status: COMPLETED | OUTPATIENT
Start: 2018-03-25 | End: 2018-03-25

## 2018-03-25 RX ORDER — HYDRALAZINE HYDROCHLORIDE 100 MG/1
100 TABLET, FILM COATED ORAL 3 TIMES DAILY
Qty: 270 TABLET | Refills: 3 | Status: SHIPPED | OUTPATIENT
Start: 2018-03-25 | End: 2019-01-28

## 2018-03-25 RX ORDER — SPIRONOLACTONE 25 MG/1
50 TABLET ORAL DAILY
Qty: 90 TABLET | Refills: 3 | Status: SHIPPED | OUTPATIENT
Start: 2018-03-25 | End: 2019-01-28

## 2018-03-25 RX ORDER — POTASSIUM CHLORIDE 20 MEQ/1
40 TABLET, EXTENDED RELEASE ORAL ONCE
Status: COMPLETED | OUTPATIENT
Start: 2018-03-25 | End: 2018-03-25

## 2018-03-25 RX ORDER — TERAZOSIN 2 MG/1
2 CAPSULE ORAL NIGHTLY
Qty: 30 CAPSULE | Refills: 0 | Status: SHIPPED | OUTPATIENT
Start: 2018-03-25 | End: 2018-03-25

## 2018-03-25 RX ORDER — CLONIDINE HYDROCHLORIDE 0.1 MG/1
TABLET ORAL
Qty: 60 TABLET | Refills: 6 | Status: SHIPPED | OUTPATIENT
Start: 2018-03-25 | End: 2018-05-14

## 2018-03-25 RX ORDER — TERAZOSIN 2 MG/1
2 CAPSULE ORAL NIGHTLY
Qty: 30 CAPSULE | Refills: 0 | Status: SHIPPED | OUTPATIENT
Start: 2018-03-25 | End: 2018-04-16

## 2018-03-25 RX ORDER — AMOXICILLIN AND CLAVULANATE POTASSIUM 875; 125 MG/1; MG/1
1 TABLET, FILM COATED ORAL 2 TIMES DAILY
Qty: 14 TABLET | Refills: 0 | Status: SHIPPED | OUTPATIENT
Start: 2018-03-25 | End: 2018-04-01

## 2018-03-25 RX ORDER — TERAZOSIN 2 MG/1
2 CAPSULE ORAL NIGHTLY
Status: DISCONTINUED | OUTPATIENT
Start: 2018-03-25 | End: 2018-03-25

## 2018-03-25 NOTE — RESPIRATORY THERAPY NOTE
LATESHA - Equipment Use Daily Summary:  · Set Mode BIFLEX  · Usage in hours: 7:28  · 90% Pressure (EPAP) level: 19  · 90% Insp Pressure (IPAP): 25  · AHI: 8  · Supplemental Oxygen:  · Comments:

## 2018-03-25 NOTE — PROGRESS NOTES
[facility] Cardiology Progress Note - Christy Colvin Patient Status:  Inpatient    10/29/1950 MRN [id number]   [facility] 8NE-A Attending Darrin Rabago MD   [facility] Day # 1 PCP MD Heather Alatorre Precordial pain     Essential hypertension     Puncture wound of foot, left, initial encounter     Chronic bilateral low back pain without sciatica     Chondrocalcinosis of right knee     Primary osteoarthritis of right hip     Trochanteric bursitis, right 1. 10  0.98   --        Recent Labs   Lab  03/23/18 2042 03/24/18   0459  03/25/18   0443   NA  142  143  141   K  4.1  3.8  3.7  3.7   CL  107  108  105   CO2  25.0  25.0  28.0   BUN  29*  29*  26*   CREATSERUM  1.10  1.06  1.00   CA  9.0  8.9  9.3   MG tab 0.6 mg 0.6 mg Oral Daily   Pantoprazole Sodium (PROTONIX) EC tab 20 mg 20 mg Oral QAM AC   pregabalin (LYRICA) cap 100 mg 100 mg Oral Nightly   Sertraline HCl (ZOLOFT) tab 100 mg 100 mg Oral Daily   insulin detemir (LEVEMIR) 100 UNIT/ML flextouch 20 Un

## 2018-03-25 NOTE — PLAN OF CARE
Diabetes/Glucose Control    • Glucose maintained within prescribed range Progressing        Patient/Family Goals    • Patient/Family Long Term Goal Progressing    • Patient/Family Short Term Goal Progressing          Pt AOx4. On RA, Bipap @ night.  SB with

## 2018-03-25 NOTE — PLAN OF CARE
Diabetes/Glucose Control    • Glucose maintained within prescribed range Adequate for Discharge        Patient/Family Goals    • Patient/Family Long Term Goal Adequate for Discharge    • Patient/Family Short Term Goal Adequate for Discharge

## 2018-03-25 NOTE — PROGRESS NOTES
03/25/18 0035   Clinical Encounter Type   Visited With Patient and family together   Continue Visiting No   Patient's Supportive Strategies/Resources ( honored patient by acknowledging his support systems.)   Sacramental Encounters   Communion G

## 2018-03-25 NOTE — PLAN OF CARE
Assumed care of patient at 0700. Patient heart rate dips and maintains in the 30's at times. Patients only complaint is that he occasionally feels lightheadedness. Up and walking with RN and heart rate increased to 70.     Dr. Lurdes Rodas made aware of abn

## 2018-03-25 NOTE — CONSULTS
INFECTIOUS DISEASE CONSULT NOTE    Brenda Ye Patient Status:  Inpatient    10/29/1950 MRN [id number]   [facility] 8NE-A Attending Shana Lesches, MD   Hosp Day # 1 PCP Ang Father    • Hypertension Father    • Lipids Father    • Heart Disorder Mother    • Diabetes Mother    • Hypertension Mother    • Lipids Sister    • Hypertension Sister       reports that he has never smoked.  He has never used smokeless tobacco. He reports mg, 20 mg, Oral, QAM AC  •  pregabalin (LYRICA) cap 100 mg, 100 mg, Oral, Nightly  •  Sertraline HCl (ZOLOFT) tab 100 mg, 100 mg, Oral, Daily  •  insulin detemir (LEVEMIR) 100 UNIT/ML flextouch 20 Units, 20 Units, Subcutaneous, Q12H  •  Calcium Carbonate A --    --        Microbiology    Reviewed in EMR,     Radiology: Xr Chest Ap Portable  (cpt=71045)    Result Date: 3/23/2018  PROCEDURE:  XR CHEST AP PORTABLE  (CPT=71045)  TECHNIQUE:  AP chest radiograph was obtained.   COMPARISON:  [facility], XR CHEST PA Montserrat Reading  3/25/2018  1:47 PM

## 2018-03-25 NOTE — DISCHARGE SUMMARY
General Medicine Discharge Summary     Patient ID:  Vitaliy Morales  69year old  10/29/1950    Admit date: 3/23/2018    Discharge date and time: 3/25/18    Attending Physician: Blayne Silva MD    Primary Care Physician: Burak Shipman MD     Reas TO CARDIOLOGY  IP CONSULT TO SPIRITUAL CARE  IP CONSULT TO SOCIAL WORK  NURSING CONSULT TO DIETITIAN  IP CONSULT TO RESPIRATORY CARE  IP CONSULT TO INFECTIOUS DISEASE  CONSULT TO WOUND OSTOMY    Operative Procedures:      Disposition: home    Patient Instr into the skin 3 (three) times daily before meals. allopurinol 300 MG Oral Tab  Take 1 tablet (300 mg total) by mouth daily.     silver sulfADIAZINE 1 % External Cream  Apply twice a day to affected area    colchicine 0.6 MG Oral Tab  Take 1 tablet (0.6 m

## 2018-03-25 NOTE — PROGRESS NOTES
[facility] Hospitalist Progress Note     [facility]      SUBJECTIVE:  Feels ok, some lightheadedness overnight  HR still to low 30's overnight  Has been having solid stool    OBJECTIVE:  Temp:  [97.7 °F (36.5 °C)-98.2 °F (36.8 °C)] 97.8 °F (36.6 °C)  Pulse 6/09/2017, 10:48. [facility], XR CHEST AP PORTABLE  (CPT=71010), 11/24/2015, 21:06.   INDICATIONS:  states HR was 44 with [facility] RN check today  PATIENT STATED HISTORY: (As transcribed by Technologist)  Patient was told by his Chacha spray 1-2 spray Nasal BID   Albuterol Sulfate  (90 Base) MCG/ACT inhaler 2 puff 2 puff Inhalation Q4H PRN   allopurinol (ZYLOPRIM) tab 300 mg 300 mg Oral Daily   colchicine tab 0.6 mg 0.6 mg Oral Daily   Pantoprazole Sodium (PROTONIX) EC tab 20 mg 2 family by bedside.     Aron Leonardo  Internal Medicine  [place] Hospitalist

## 2018-03-25 NOTE — PROGRESS NOTES
Cardiology follow-up  I had a long conversation with patient and family with regards to the indications for permanent pacing in second-degree type I AV block.   At this time we will plan to discharge patient follow-up with 30 day event monitor EP evaluation

## 2018-03-26 NOTE — PAYOR COMM NOTE
--------------  Order Requisition   Admit to inpatient Once (Order #[id number]) on 3/24/18     ADMISSION REVIEW     Payor: Jose A Wilkes [place] #:  [id number]  Authorization Number: [id number]    Admit date: 3/24/18  Admit time: Clorinda Burkitt 9/3/12: OTHER SURGICAL HISTORY      Comment: right knee     Positive for stated complaint: states HR was 44 with [facility] RN check today  Physical Exam   BP: (!) 187/61  Pulse: 71 -58  Resp: 20  Temp: 97.9   SpO2: 98 % RA  BMI 46.97     Physical Patient an IV line established and blood work drawn including a CBC, chemistries, BUN, and blood sugar all of which are unremarkable. Patient's troponin is negative. Patient's BNP is mildly elevated. Patient's coags unremarkable.   The patient had EKG an tamsulosin HCl 0.4 MG Oral Cap   pregabalin 50 MG Oral Cap   Esomeprazole Magnesium (NEXIUM) 40 MG Oral Capsule Delayed Release   HydrALAZINE HCl 100 MG Oral Tab   CloNIDine HCl 0.1 MG Oral Tab   spironolactone 25 MG Oral Tab   Olmesartan Medoxomil-HCTZ (B - has had some hypoglycemia  - Hold home glipzide   - SSC, detemir 20 units BID (at home glargine 65 in am and 50 in PM)    # Gout  - Continue home colchicine and allopurinol    # BPH  - Tamsulosin stopped per cardiology, terzosin initiated    [facility] 3.  D/C Flomax and substitute Hytrin  4. Repeat echo, LV function  5. Patient has borderline indication for permanent pacemaker with extremely low heart rate.   Will try to manipulate blood pressure medications possibly less bradycardia.    03/25/18 1885 03/25/18 0739  39 -- -- 97 %   03/25/18 0738  49 -- -- 97 %   03/25/18 0737  40 -- -- 96 %   03/25/18 0736  42 -- -- 97 %   03/25/18 0735 53 -- -- 96 %   03/25/18 0734  48 -- -- 97 %   03/25/18 0733  41 -- -- 97 %   03/25/18 0732  45 -- -- 98 %   03/25/18 03/24/18 1001 51 --  167/47 97 %   03/24/18 1000  46 -- -- 97 %   03/24/18 0900 57 -- -- 99 %   03/24/18 0846 80 --  190/62 98 %   03/24/18 0800  47 -- -- 97 %   03/24/18 0700  46 -- -- 96 %   03/24/18 0408 52 20 148/56 96 %   03/24/18 0024 -- -- -- --   0

## 2018-03-28 NOTE — PAYOR COMM NOTE
--------------  DISCHARGE REVIEW    Payor: Jose A Wilkes [place] #:  [id number]  Authorization Number: [id number]    Admit date: 3/24/18  Admit time:  0005  Discharge Date: 3/25/2018  6:34 PM     Admitting Physician: Aleida Yu without diarrhea  - order d/c'ed as no further diarrhea     # HTN  - change clonidine per cardiology  - Continue amlodipine, hydralazine  - HCTZ, aldactone     # BPH  - Tamsulosin stopped per cardiology recommendations  - Terazosin in its place at d/c    Tab  Take 1 tablet by mouth once daily. Sertraline HCl 100 MG Oral Tab  Take 1 tablet (100 mg total) by mouth daily. furosemide 20 MG Oral Tab  Take 60 mg by mouth daily. Pt unsure of dose?      BD PEN NEEDLE ULTRAFINE 29G X 12.7MM Does not apply Misc

## 2018-03-30 ENCOUNTER — OFFICE VISIT (OUTPATIENT)
Dept: WOUND CARE | Age: 68
End: 2018-03-30
Attending: NURSE PRACTITIONER
Payer: MEDICARE

## 2018-03-30 DIAGNOSIS — L97.521 NON-PRESSURE CHRONIC ULCER OF OTHER PART OF LEFT FOOT LIMITED TO BREAKDOWN OF SKIN (HCC): Primary | ICD-10-CM

## 2018-03-30 DIAGNOSIS — L97.509 DM FOOT ULCER (HCC): ICD-10-CM

## 2018-03-30 DIAGNOSIS — E11.621 DM FOOT ULCER (HCC): ICD-10-CM

## 2018-03-30 PROCEDURE — 97597 DBRDMT OPN WND 1ST 20 CM/<: CPT

## 2018-03-30 NOTE — PROGRESS NOTES
Progress Note Details  Patient Name: Aliya Dee  Date: 3/30/2018   Patient Number: [id number] Physician / Josiah Horan: Branden Perkins   Patient YOB: 1950 Facility: [facility]    Chief Complaint  This information was obtain 2/2/18-Pt here for management of left heel diabetic ulcer. Spouse stated that she was able to do dressing changes as ordered. Afebrile at home and no malodor to any drainage.  Denies any pain, has been offloading with felted foam donut and own diabetic shoe 3/23/18-here for left foot DFU management. Afebrile. Spouse has been doing dressing changes. Adhering to ADA diet and monitoring BS levels. Reports mild pain this visit. Has been on his feet a lot past week. 3/20/18-Pt here for left foot DFU management.  A BP Elevated, on antihypertensive meds, asymptomatic, pt rechecked at home after resting and called with BP of 166/90mmhg and asymptomatic. . Pulse RRR. RR within normal limits. Afebrile. Obesity. Alert, calm, well developed, in no apparent distress.  Height/ Wound depth has decreased and undermining has decreased. No malodor to drainage and no pain. Afebrile. Currently on Augmentin for wound infection.    Assessment    Active Problems    ICD-10  (Encounter Diagnosis) A49.8 - Other bacterial infections of unspec Collagen - Shaina Ag Three times a week    Thick Foam  Kerlix  Paper tape  Medipore tape (no substitution) - or paper tape at home  Change dressing 3x/week - Monday, Wednesday and Friday    Off-Loading  Pressure relief shoe / inserts / foams - Felted foam 68 yo male here for left heel diabetic ulcer management. Wound depth decreased and undermining decreased. Wound culture positive and taking Augmentin and advised to complete medication as ordered. He is checking blood sugar levels 8 times a day.  Appreciate

## 2018-04-04 ENCOUNTER — APPOINTMENT (OUTPATIENT)
Dept: WOUND CARE | Age: 68
End: 2018-04-04
Attending: NURSE PRACTITIONER
Payer: MEDICARE

## 2018-04-06 ENCOUNTER — OFFICE VISIT (OUTPATIENT)
Dept: WOUND CARE | Age: 68
End: 2018-04-06
Attending: NURSE PRACTITIONER
Payer: MEDICARE

## 2018-04-06 DIAGNOSIS — L97.521 NON-PRESSURE CHRONIC ULCER OF OTHER PART OF LEFT FOOT LIMITED TO BREAKDOWN OF SKIN (HCC): Primary | ICD-10-CM

## 2018-04-06 PROCEDURE — 97597 DBRDMT OPN WND 1ST 20 CM/<: CPT

## 2018-04-06 NOTE — PROGRESS NOTES
Progress Note Details  Patient Name: Tracy Tucker  Date: 4/6/2018   Patient Number: [id number] Physician / Fox Smart: Eri Loaiza   Patient YOB: 1950 Facility: [facility]    Chief Complaint  This information was obtaine 2/2/18-Pt here for management of left heel diabetic ulcer. Spouse stated that she was able to do dressing changes as ordered. Afebrile at home and no malodor to any drainage.  Denies any pain, has been offloading with felted foam donut and own diabetic shoe 3/23/18-here for left foot DFU management. Afebrile. Spouse has been doing dressing changes. Adhering to ADA diet and monitoring BS levels. Reports mild pain this visit. Has been on his feet a lot past week. 3/30/18-Pt here for left foot DFU management.  A Additional Information  Medication reconciliation completed at today's visit. : Yes        Objective    Constitutional  BP Elevated, on antihypertensive meds, asymptomatic. Pulse RRR. RR within normal limits. Afebrile. Obesity.  Alert, calm, well developed, (Encounter Diagnosis) A49.8 - Other bacterial infections of unspecified site  (Encounter Diagnosis) L97.521 - Non-pressure chronic ulcer of other part of left foot limited to breakdown of skin  (Encounter Diagnosis) E11.621 - Type 2 diabetes mellitus with Heel off-loading boot - Global ped shoes may be purchased   Left foot    Follow-Up Appointments  Return Appointment in 1 week. Other: - F/U with  (Vijay) as scheduled. F/U with diabetes educator .   Monitor blood sugar levels as recommended by  68 yo male here for left heel diabetic ulcer management. Wound decreased in size and no s/s of infection. He is checking blood sugar levels 8 times a day.  Appreciated and encouraged to continue with ADA diet and blood sugar monitoring to promote wound heal

## 2018-04-13 ENCOUNTER — OFFICE VISIT (OUTPATIENT)
Dept: WOUND CARE | Age: 68
End: 2018-04-13
Attending: NURSE PRACTITIONER
Payer: MEDICARE

## 2018-04-13 DIAGNOSIS — E11.621 DM FOOT ULCER (HCC): ICD-10-CM

## 2018-04-13 DIAGNOSIS — T14.8XXA NONHEALING NONSURGICAL WOUND: ICD-10-CM

## 2018-04-13 DIAGNOSIS — L97.509 DM FOOT ULCER (HCC): ICD-10-CM

## 2018-04-13 DIAGNOSIS — L97.521 NON-PRESSURE CHRONIC ULCER OF OTHER PART OF LEFT FOOT LIMITED TO BREAKDOWN OF SKIN (HCC): Primary | ICD-10-CM

## 2018-04-13 PROCEDURE — 99213 OFFICE O/P EST LOW 20 MIN: CPT

## 2018-04-13 NOTE — PROGRESS NOTES
Progress Note Details  Patient Name: Belkis Melara  Date: 4/13/2018   Patient Number: [id number] Physician / Narcisa Pearl: Andreina Israel   Patient YOB: 1950 [facility]: [facility]    Chief Complaint  This information was obtain 2/2/18-Pt here for management of left heel diabetic ulcer. Spouse stated that she was able to do dressing changes as ordered. Afebrile at home and no malodor to any drainage.  Denies any pain, has been offloading with felted foam donut and own diabetic shoe 3/23/18-here for left foot DFU management. Afebrile. Spouse has been doing dressing changes. Adhering to ADA diet and monitoring BS levels. Reports mild pain this visit. Has been on his feet a lot past week. 3/30/18-Pt here for left foot DFU management.  A Psychiatric: Anxiety, Depression, Mental Illness  Prior Wound History: Drainage, Malodor, Pain    Additional Information  Medication reconciliation completed at today's visit. : Yes        Objective    Constitutional  BP Elevated, on antihypertensive meds, (Encounter Diagnosis) E66.8 - Other obesity  (Encounter Diagnosis) J45.909 - Unspecified asthma, uncomplicated        Plan    Additional Orders: Follow-Up Appointments  Discharge from Outpatient Services. - Dry gauze x 1 week.   Offloading foam donut x1

## 2018-05-14 ENCOUNTER — HOSPITAL ENCOUNTER (OUTPATIENT)
Facility: HOSPITAL | Age: 68
Setting detail: OBSERVATION
Discharge: HOME | End: 2018-05-17
Attending: EMERGENCY MEDICINE | Admitting: INTERNAL MEDICINE
Payer: MEDICARE

## 2018-05-14 ENCOUNTER — APPOINTMENT (OUTPATIENT)
Dept: GENERAL RADIOLOGY | Facility: HOSPITAL | Age: 68
End: 2018-05-14
Payer: MEDICARE

## 2018-05-14 DIAGNOSIS — R07.9 CHEST PAIN OF UNCERTAIN ETIOLOGY: Primary | ICD-10-CM

## 2018-05-14 PROCEDURE — 83036 HEMOGLOBIN GLYCOSYLATED A1C: CPT | Performed by: HOSPITALIST

## 2018-05-14 PROCEDURE — 85378 FIBRIN DEGRADE SEMIQUANT: CPT | Performed by: EMERGENCY MEDICINE

## 2018-05-14 PROCEDURE — 96372 THER/PROPH/DIAG INJ SC/IM: CPT

## 2018-05-14 PROCEDURE — 99285 EMERGENCY DEPT VISIT HI MDM: CPT

## 2018-05-14 PROCEDURE — 84484 ASSAY OF TROPONIN QUANT: CPT | Performed by: EMERGENCY MEDICINE

## 2018-05-14 PROCEDURE — 80053 COMPREHEN METABOLIC PANEL: CPT | Performed by: EMERGENCY MEDICINE

## 2018-05-14 PROCEDURE — 71045 X-RAY EXAM CHEST 1 VIEW: CPT | Performed by: EMERGENCY MEDICINE

## 2018-05-14 PROCEDURE — 85025 COMPLETE CBC W/AUTO DIFF WBC: CPT | Performed by: EMERGENCY MEDICINE

## 2018-05-14 PROCEDURE — 93010 ELECTROCARDIOGRAM REPORT: CPT

## 2018-05-14 PROCEDURE — 93005 ELECTROCARDIOGRAM TRACING: CPT

## 2018-05-14 PROCEDURE — 36415 COLL VENOUS BLD VENIPUNCTURE: CPT

## 2018-05-14 PROCEDURE — 82962 GLUCOSE BLOOD TEST: CPT

## 2018-05-14 RX ORDER — ACETAMINOPHEN 325 MG/1
650 TABLET ORAL EVERY 6 HOURS PRN
Status: DISCONTINUED | OUTPATIENT
Start: 2018-05-14 | End: 2018-05-16

## 2018-05-14 RX ORDER — PANTOPRAZOLE SODIUM 40 MG/1
40 TABLET, DELAYED RELEASE ORAL
Status: DISCONTINUED | OUTPATIENT
Start: 2018-05-15 | End: 2018-05-17

## 2018-05-14 RX ORDER — OLMESARTAN MEDOXOMIL AND HYDROCHLOROTHIAZIDE 40/25 40; 25 MG/1; MG/1
1 TABLET ORAL
Status: DISCONTINUED | OUTPATIENT
Start: 2018-05-15 | End: 2018-05-14

## 2018-05-14 RX ORDER — COLCHICINE 0.6 MG/1
0.6 TABLET ORAL
Status: DISCONTINUED | OUTPATIENT
Start: 2018-05-14 | End: 2018-05-17

## 2018-05-14 RX ORDER — ATORVASTATIN CALCIUM 10 MG/1
10 TABLET, FILM COATED ORAL NIGHTLY
COMMUNITY
End: 2019-01-28

## 2018-05-14 RX ORDER — SERTRALINE HYDROCHLORIDE 100 MG/1
100 TABLET, FILM COATED ORAL DAILY
Status: DISCONTINUED | OUTPATIENT
Start: 2018-05-15 | End: 2018-05-17

## 2018-05-14 RX ORDER — DEXTROSE MONOHYDRATE 25 G/50ML
50 INJECTION, SOLUTION INTRAVENOUS
Status: DISCONTINUED | OUTPATIENT
Start: 2018-05-14 | End: 2018-05-17

## 2018-05-14 RX ORDER — SPIRONOLACTONE 25 MG/1
50 TABLET ORAL DAILY
Status: DISCONTINUED | OUTPATIENT
Start: 2018-05-14 | End: 2018-05-14

## 2018-05-14 RX ORDER — ASPIRIN 81 MG/1
324 TABLET, CHEWABLE ORAL ONCE
Status: DISCONTINUED | OUTPATIENT
Start: 2018-05-14 | End: 2018-05-14

## 2018-05-14 RX ORDER — ALLOPURINOL 300 MG/1
300 TABLET ORAL NIGHTLY
Status: DISCONTINUED | OUTPATIENT
Start: 2018-05-14 | End: 2018-05-17

## 2018-05-14 RX ORDER — CLONIDINE HYDROCHLORIDE 0.1 MG/1
0.05 TABLET ORAL 2 TIMES DAILY
COMMUNITY
End: 2018-09-27 | Stop reason: ALTCHOICE

## 2018-05-14 RX ORDER — PREGABALIN 100 MG/1
100 CAPSULE ORAL NIGHTLY
Status: DISCONTINUED | OUTPATIENT
Start: 2018-05-14 | End: 2018-05-17

## 2018-05-14 RX ORDER — SODIUM CHLORIDE 9 MG/ML
INJECTION, SOLUTION INTRAVENOUS CONTINUOUS
Status: DISCONTINUED | OUTPATIENT
Start: 2018-05-14 | End: 2018-05-15

## 2018-05-14 RX ORDER — HEPARIN SODIUM 5000 [USP'U]/ML
7500 INJECTION, SOLUTION INTRAVENOUS; SUBCUTANEOUS EVERY 8 HOURS SCHEDULED
Status: DISCONTINUED | OUTPATIENT
Start: 2018-05-14 | End: 2018-05-17

## 2018-05-14 RX ORDER — ATORVASTATIN CALCIUM 10 MG/1
10 TABLET, FILM COATED ORAL NIGHTLY
Status: DISCONTINUED | OUTPATIENT
Start: 2018-05-14 | End: 2018-05-17

## 2018-05-14 RX ORDER — TERAZOSIN 2 MG/1
2 CAPSULE ORAL NIGHTLY
Status: DISCONTINUED | OUTPATIENT
Start: 2018-05-14 | End: 2018-05-17

## 2018-05-15 PROCEDURE — 84443 ASSAY THYROID STIM HORMONE: CPT | Performed by: HOSPITALIST

## 2018-05-15 PROCEDURE — 82962 GLUCOSE BLOOD TEST: CPT

## 2018-05-15 PROCEDURE — 85025 COMPLETE CBC W/AUTO DIFF WBC: CPT | Performed by: HOSPITALIST

## 2018-05-15 PROCEDURE — 84484 ASSAY OF TROPONIN QUANT: CPT | Performed by: INTERNAL MEDICINE

## 2018-05-15 PROCEDURE — 93005 ELECTROCARDIOGRAM TRACING: CPT

## 2018-05-15 PROCEDURE — 93010 ELECTROCARDIOGRAM REPORT: CPT | Performed by: INTERNAL MEDICINE

## 2018-05-15 PROCEDURE — 94660 CPAP INITIATION&MGMT: CPT

## 2018-05-15 PROCEDURE — 80061 LIPID PANEL: CPT | Performed by: HOSPITALIST

## 2018-05-15 PROCEDURE — 96372 THER/PROPH/DIAG INJ SC/IM: CPT

## 2018-05-15 PROCEDURE — 80048 BASIC METABOLIC PNL TOTAL CA: CPT | Performed by: HOSPITALIST

## 2018-05-15 RX ORDER — HYDRALAZINE HYDROCHLORIDE 50 MG/1
100 TABLET, FILM COATED ORAL 3 TIMES DAILY
Status: DISCONTINUED | OUTPATIENT
Start: 2018-05-15 | End: 2018-05-17

## 2018-05-15 RX ORDER — AMLODIPINE BESYLATE 5 MG/1
10 TABLET ORAL DAILY
Status: DISCONTINUED | OUTPATIENT
Start: 2018-05-15 | End: 2018-05-17

## 2018-05-15 RX ORDER — SODIUM CHLORIDE 9 MG/ML
INJECTION, SOLUTION INTRAVENOUS CONTINUOUS
Status: DISCONTINUED | OUTPATIENT
Start: 2018-05-15 | End: 2018-05-17

## 2018-05-15 RX ORDER — CHLORHEXIDINE GLUCONATE 4 G/100ML
30 SOLUTION TOPICAL
Status: COMPLETED | OUTPATIENT
Start: 2018-05-15 | End: 2018-05-15

## 2018-05-15 NOTE — CONSULTS
[facility] Cardiology  Report of Consultation    Brendabernice Belcher Patient Status:  Observation    10/29/1950 MRN [id number]   [facility] 8NE-A Attending Theora Master, [address] [facility] Day # 0 PCP Gulshan Ribera MD     Reason for Past Surgical History:  No date: CATARACT  1991: OTHER SURGICAL HISTORY      Comment: bladder cancer  20: OTHER SURGICAL HISTORY      Comment: cyst on leg  15: OTHER SURGICAL HISTORY      Comment: rectum  11/09: OTHER SURGICAL HISTORY      Comment: Irasema Nguyễn capsule Rfl: 0   spironolactone 25 MG Oral Tab Take 2 tablets (50 mg total) by mouth daily. Disp: 90 tablet Rfl: 3   HydrALAZINE HCl 100 MG Oral Tab Take 1 tablet (100 mg total) by mouth 3 (three) times daily.  Disp: 270 tablet Rfl: 3   pregabalin 50 MG Ora equal in symmetric fashion. No bruits are noted. Cardiac: Regular rate and rhythm. There is a normal S1 and S2. No S3 or S4. No murmurs, rubs, or gallops. PMI is non-displaced with a normal apical impulse. Lungs: Clear to ascultation bilaterally. There was no diagnostic evidence for regional wall motion     abnormalities. 2. Aortic valve: Thickening, consistent with sclerosis. 3. Left atrium: The left atrium was mildly dilated. The left atrial volume     was moderately increased.       Assessment:

## 2018-05-15 NOTE — RESPIRATORY THERAPY NOTE
LATESHA - Equipment Use Daily Summary:                  . Set Mode: BI-FLEX                . Usage in hours: 2:45                . 90% Pressure (EPAP) level: 19                . 90% Insp. Pressure (IPAP): 25                . AHI: 2.6                .  Supplement

## 2018-05-15 NOTE — PROGRESS NOTES
05/15/18 0805   Clinical Encounter Type   Referral To (Referral made to Father Dottie Vines for [facility] visit with patient.   to remain available at pager [phone]. )

## 2018-05-15 NOTE — PLAN OF CARE
Pt a/ox4. Pt denies dizziness. RA. Lungs clear. No cough. Bi-pap at night and at home. 2nd degree heart block Weinkeback. EF = 60-65%. Denies CP and SOB. BM today. Voids. Up with standby assist. Wife at bedside and updated with POC.  Pt has a continuous blo

## 2018-05-15 NOTE — H&P
[facility] Hospitalist History and Physical      Patient presents with:  Chest Pain Angina (cardiovascular)       PCP: Sridevi Peterson MD      History of Present Illness: Patient is a 69year old male with PMH sig for BPH, DM2, cad, htn, hl and morbid obesity HPI.  HEENT:  Eyes:  No diplopia or blurred vision. ENT:  No earache, sore throat or runny nose. CARDIOVASCULAR:  +chest pain  RESPIRATORY:  No cough, shortness of breath, PND or orthopnea. GASTROINTESTINAL:  No nausea, vomiting or diarrhea.   Minnie Akhtar 05/14/2018   BILT 0.4 05/14/2018   TP 7.3 05/14/2018   AST 33 05/14/2018   ALT 34 05/14/2018   TSH 4.260 05/15/2018   DDIMER 0.46 05/14/2018   TROP <0.046 05/15/2018   PGLU 166 05/15/2018       CXR: image personally reviewed.       Radiology: Xr Chest Ap Po

## 2018-05-15 NOTE — CONSULTS
[facility]    PATIENT'S NAME: Citlali Smalls   ATTENDING PHYSICIAN: Pierre Rubio DO   CONSULTING PHYSICIAN: Dora Encarnacion M.D.    PATIENT ACCOUNT#:   [de-identified]    LOCATION:  [address] [place] [address]  MEDICAL RECORD #:   [id number]       DATE OF BIR SYSTEMS:  Review of systems was obtained and was negative including no history of bleeding diathesis or thyroid disease. PHYSICAL EXAMINATION:    GENERAL:  He is a pleasant heavyset gentleman in no distress.     VITAL SIGNS:  Blood pressure 150/65, pul

## 2018-05-15 NOTE — ED PROVIDER NOTES
Patient Seen in: [facility] Emergency Department    History   Patient presents with:  Chest Pain Angina (cardiovascular)    Stated Complaint: CP    JACE    Joselyn Khan is a 20-year-old male presenting to the emergency department for chest pain.   He has b 1927]  Resp: 18 [05/14/18 1927]  Temp: 98.2 °F (36.8 °C) [05/14/18 1927]  Temp src: Oral [05/14/18 1927]  SpO2: 96 % [05/14/18 1927]  O2 Device: None (Room air) [05/14/18 1922]    Current:/65   Pulse (!) 44   Temp 98.2 °F (36.8 °C) (Oral)   Resp 21 GREEN   RAINBOW DRAW GOLD     EKG    Rate, intervals and axes as noted on EKG Report.   Rate: 46  Rhythm: Sinus Rhythm  Reading: No areas of acute ST segment elevation or depressed           ED Course as of May 14 2124  -------------------------------------

## 2018-05-15 NOTE — ED INITIAL ASSESSMENT (HPI)
Patient noticed his pulse oximetry at home was low on his monitor. Patient O2 saturations 95% on arrival. He states he has some chest pain today.  Patient states he did feel some SOB with the chest pain

## 2018-05-15 NOTE — PROGRESS NOTES
05/15/18 1342   Clinical Encounter Type   Visited With Family   Sacramental Encounters   Sacrament of Sick-Anointing Anointed  (Father Blessing De Luna provided prayer, Scripture, support and Sacrament of the Sick.   to remain available at pager [phone])

## 2018-05-15 NOTE — PROGRESS NOTES
[facility] Progress Note:  Anticoagulation Weight Dose Adjustment for heparin    Mac Padron is a 69year old male who has been prescribed heparin for VTE prophylaxis.       Estimated Creatinine Clearance: 45.5 mL/min (A) (based on SCr of 1.73 mg/dL

## 2018-05-15 NOTE — PLAN OF CARE
NURSING ADMISSION NOTE      Patient admitted via Cart  Oriented to room. Safety precautions initiated. Bed in low position. Call light in reach. Patient received from ER. Patient a&ox4. SB on tele, hr 40's.  Pt denies chest pain or shortness of br

## 2018-05-16 ENCOUNTER — APPOINTMENT (OUTPATIENT)
Dept: GENERAL RADIOLOGY | Facility: HOSPITAL | Age: 68
End: 2018-05-16
Attending: NURSE PRACTITIONER
Payer: MEDICARE

## 2018-05-16 ENCOUNTER — APPOINTMENT (OUTPATIENT)
Dept: INTERVENTIONAL RADIOLOGY/VASCULAR | Facility: HOSPITAL | Age: 68
End: 2018-05-16
Attending: INTERNAL MEDICINE
Payer: MEDICARE

## 2018-05-16 PROCEDURE — 82962 GLUCOSE BLOOD TEST: CPT

## 2018-05-16 PROCEDURE — 0JH606Z INSERTION OF PACEMAKER, DUAL CHAMBER INTO CHEST SUBCUTANEOUS TISSUE AND FASCIA, OPEN APPROACH: ICD-10-PCS | Performed by: INTERNAL MEDICINE

## 2018-05-16 PROCEDURE — 80048 BASIC METABOLIC PNL TOTAL CA: CPT | Performed by: INTERNAL MEDICINE

## 2018-05-16 PROCEDURE — 02HK3JZ INSERTION OF PACEMAKER LEAD INTO RIGHT VENTRICLE, PERCUTANEOUS APPROACH: ICD-10-PCS | Performed by: INTERNAL MEDICINE

## 2018-05-16 PROCEDURE — 99153 MOD SED SAME PHYS/QHP EA: CPT

## 2018-05-16 PROCEDURE — 96372 THER/PROPH/DIAG INJ SC/IM: CPT

## 2018-05-16 PROCEDURE — 71045 X-RAY EXAM CHEST 1 VIEW: CPT | Performed by: NURSE PRACTITIONER

## 2018-05-16 PROCEDURE — 02H63JZ INSERTION OF PACEMAKER LEAD INTO RIGHT ATRIUM, PERCUTANEOUS APPROACH: ICD-10-PCS | Performed by: INTERNAL MEDICINE

## 2018-05-16 PROCEDURE — 33208 INSRT HEART PM ATRIAL & VENT: CPT

## 2018-05-16 PROCEDURE — 99152 MOD SED SAME PHYS/QHP 5/>YRS: CPT

## 2018-05-16 RX ORDER — DIPHENHYDRAMINE HYDROCHLORIDE 50 MG/ML
INJECTION INTRAMUSCULAR; INTRAVENOUS
Status: COMPLETED
Start: 2018-05-16 | End: 2018-05-16

## 2018-05-16 RX ORDER — ACETAMINOPHEN AND CODEINE PHOSPHATE 300; 30 MG/1; MG/1
2 TABLET ORAL EVERY 4 HOURS PRN
Status: DISCONTINUED | OUTPATIENT
Start: 2018-05-16 | End: 2018-05-17

## 2018-05-16 RX ORDER — CLONIDINE HYDROCHLORIDE 0.1 MG/1
0.05 TABLET ORAL 2 TIMES DAILY
Status: DISCONTINUED | OUTPATIENT
Start: 2018-05-16 | End: 2018-05-17

## 2018-05-16 RX ORDER — BACITRACIN 50000 [USP'U]/1
INJECTION, POWDER, LYOPHILIZED, FOR SOLUTION INTRAMUSCULAR
Status: COMPLETED
Start: 2018-05-16 | End: 2018-05-16

## 2018-05-16 RX ORDER — LIDOCAINE HYDROCHLORIDE 10 MG/ML
INJECTION, SOLUTION EPIDURAL; INFILTRATION; INTRACAUDAL; PERINEURAL
Status: COMPLETED
Start: 2018-05-16 | End: 2018-05-16

## 2018-05-16 RX ORDER — MIDAZOLAM HYDROCHLORIDE 1 MG/ML
INJECTION INTRAMUSCULAR; INTRAVENOUS
Status: COMPLETED
Start: 2018-05-16 | End: 2018-05-16

## 2018-05-16 RX ORDER — ACETAMINOPHEN 325 MG/1
650 TABLET ORAL EVERY 4 HOURS PRN
Status: DISCONTINUED | OUTPATIENT
Start: 2018-05-16 | End: 2018-05-17

## 2018-05-16 RX ORDER — ACETAMINOPHEN AND CODEINE PHOSPHATE 300; 30 MG/1; MG/1
1 TABLET ORAL EVERY 4 HOURS PRN
Status: DISCONTINUED | OUTPATIENT
Start: 2018-05-16 | End: 2018-05-17

## 2018-05-16 RX ORDER — FUROSEMIDE 40 MG/1
40 TABLET ORAL DAILY
Status: DISCONTINUED | OUTPATIENT
Start: 2018-05-16 | End: 2018-05-17

## 2018-05-16 RX ORDER — ONDANSETRON 2 MG/ML
4 INJECTION INTRAMUSCULAR; INTRAVENOUS EVERY 6 HOURS PRN
Status: DISCONTINUED | OUTPATIENT
Start: 2018-05-16 | End: 2018-05-17

## 2018-05-16 NOTE — RESPIRATORY THERAPY NOTE
LATESHA Equipment Usage Summary :            Set Mode :BILEVEL S/T          Usage in Hours:6;56          90% Pressure (EPAP) : 19           90% Insp Pressure (IPAP);25          AHI : 7.9          Supplemental Oxygen :      LPM

## 2018-05-16 NOTE — PROGRESS NOTES
[facility] Hospitalist Progress Note                                                                   [address]  10/29/1950    SUBJECTIVE: pt is s/p pacemaker.  He denies cp, sob and dizzine 1-5 Units Subcutaneous TID CC and HS     Continuous Infusions:   • sodium chloride       PRN: ondansetron HCl, acetaminophen **OR** Acetaminophen-Codeine #3 **OR** Acetaminophen-Codeine #3, glucose **OR** Glucose-Vitamin C **OR** dextrose **OR** glucose **

## 2018-05-16 NOTE — PLAN OF CARE
Diabetes/Glucose Control    • Glucose maintained within prescribed range Progressing        Patient/Family Goals    • Patient/Family Long Term Goal Progressing    • Patient/Family Short Term Goal Progressing        Pt is AOX4, VSS, SB with 1st degree AV an

## 2018-05-16 NOTE — PROCEDURES
[facility]    PATIENT'S NAME: Katherine Montilla   ATTENDING PHYSICIAN: Dottie Gonzales DO   OPERATING PHYSICIAN: Andry Bass M.D.    PATIENT ACCOUNT#:   [de-identified]    LOCATION:  [address] [place] [address]  MEDICAL RECORD #:   [id number]       DATE OF PRIMITIVO [id number], serial [id number]. This was placed in the pocket with excess lead posterior to it and was sutured. The device was tested transcutaneously. R waves were 6.5, pacing impedance 0.5 V at 0.4 millisecond, lead impedance 608.   P waves were 2.5, pacin

## 2018-05-16 NOTE — PROGRESS NOTES
[facility] Cardiology  Progress Note    Nicolas Eng Patient Status:  Observation    10/29/1950 MRN [id number]   [facility] 8NE-A Attending Titi Delvalle, [address] [facility] Day # 0 PCP Trisha Triana MD     Subjective:   Gabriela Sierra RASH    Comment:STOPS BREATHING  Ibuprofen               ANAPHYLAXIS  Mold                    Runny nose    Physical Exam:   General:  Well-developed / Well-nourished. No acute distress. HEENT:  Normocephalic. Atraumatic. No icterus.   Neck:  There is n Tele: AV Paced    Diagnostics:    Echo 3/18:     Conclusions:    1. Left ventricle: The cavity size was normal. Wall thickness was mildly     increased. Systolic function was normal. The estimated ejection fraction     was 60-65%.  There was no diag

## 2018-05-16 NOTE — PLAN OF CARE
Assumed care for patient at 0730 on 5/16. Patient had left upper chest dual chamber PPM placed by Dr. Clint Hernandez today. Left arm sling in place. Post-op CXR completed. A&O X4. VSS on RA. AV - V paced. . Bipap at night. Lungs diminished.    Tylenol #3 for

## 2018-05-17 ENCOUNTER — APPOINTMENT (OUTPATIENT)
Dept: GENERAL RADIOLOGY | Facility: HOSPITAL | Age: 68
End: 2018-05-17
Attending: INTERNAL MEDICINE
Payer: MEDICARE

## 2018-05-17 VITALS
SYSTOLIC BLOOD PRESSURE: 153 MMHG | OXYGEN SATURATION: 98 % | TEMPERATURE: 98 F | HEIGHT: 73 IN | DIASTOLIC BLOOD PRESSURE: 63 MMHG | RESPIRATION RATE: 18 BRPM | HEART RATE: 66 BPM | BODY MASS INDEX: 41.75 KG/M2 | WEIGHT: 315 LBS

## 2018-05-17 PROCEDURE — 71046 X-RAY EXAM CHEST 2 VIEWS: CPT | Performed by: INTERNAL MEDICINE

## 2018-05-17 PROCEDURE — 96372 THER/PROPH/DIAG INJ SC/IM: CPT

## 2018-05-17 PROCEDURE — 82962 GLUCOSE BLOOD TEST: CPT

## 2018-05-17 NOTE — PLAN OF CARE
IV discontinued. Left chest incision intact. Vitals stable. Denies pain. Reviewed discharge instructions and follow up appointments. Patient verbalized understanding and all questions answered.

## 2018-05-17 NOTE — PLAN OF CARE
Assumed care for this patient at 0730: patient alert and oriented. s/p pacemaker. Left chest incision intact. Complains of mild pain 2/10. Tylenol prn. Left chest incision soft, no hematoma noted. Blood pressure in the 160's this am. Po meds given.  Will mo

## 2018-05-17 NOTE — PROGRESS NOTES
[facility] Cardiology  Progress Note    Verenice Glover Patient Status:  Observation    10/29/1950 MRN [id number]   [facility] 8NE-A Attending Oz Arreaga, [address] [facility] Day # 0 PCP Sridevi Peterson MD     Subjective:   Sherri Montemayor ml       Wt Readings from Last 3 Encounters:  05/14/18 : (!) 356 lb 0.7 oz (161.5 kg)  05/10/18 : (!) 354 lb (160.6 kg)  04/11/18 : (!) 352 lb (159.7 kg)      Allergies:    Aspirin                 ANAPHYLAXIS, RASH    Comment:STOPS BREATHING  Ibuprofen NEPRELIM  7.62*  4.91   WBC  11.2  8.7   PLT  238.0  196.0       No results for input(s): PTP, INR in the last 168 hours.     Recent Labs   Lab  05/14/18   1925  05/15/18   0457   TROP  <0.046  <0.046         Tele: AV Paced    Diagnostics:    Echo 3/18:

## 2018-05-17 NOTE — PLAN OF CARE
Diabetes/Glucose Control    • Glucose maintained within prescribed range Progressing        Patient/Family Goals    • Patient/Family Long Term Goal Progressing    • Patient/Family Short Term Goal Progressing        Pt is AOX4, VSS, AV Paced on tele monitor

## 2018-05-17 NOTE — DISCHARGE SUMMARY
[facility] Internal Medicine Discharge Summary   Patient ID:  Luis Alberto Crawley  [id number]  69year old  10/29/1950    Admit date: 5/14/2018    Discharge date and time: 5/17/2018     Attending Physician: Neena Kraft    Primary Care Physician: Debi Fuentes cont statin     #LATESHA- bipap at night     #Morbid obesity- needs pcp follow up and weight loss plan     PPX: SCDs       Day of discharge Exam    05/17/18  0900   BP:    Pulse:    Resp: 18   Temp: 97.6 °F (36.4 °C)       Exam on day of discharge:  Pt feels w Sertraline HCl 100 MG Tabs  Commonly known as:  ZOLOFT  Take 1 tablet (100 mg total) by mouth daily. spironolactone 25 MG Tabs  Commonly known as:  ALDACTONE  Take 2 tablets (50 mg total) by mouth daily.      Terazosin HCl 2 MG Caps  Commonly known as ventricle. No sign of pneumothorax, CHF, sizable effusion or acute pneumonia.     Dictated by: Sharona Dorantes MD on 5/16/2018 at 11:14     Approved by: Sharona Dorantes MD            Xr Chest Ap Portable  (cpt=71045)    Result Date: 5/14/2018  PROCEDURE:

## 2018-07-30 PROBLEM — Z95.0 CARDIAC PACEMAKER IN SITU: Status: ACTIVE | Noted: 2018-07-30

## 2018-08-21 PROCEDURE — 81003 URINALYSIS AUTO W/O SCOPE: CPT | Performed by: INTERNAL MEDICINE

## 2018-08-21 PROCEDURE — 82310 ASSAY OF CALCIUM: CPT | Performed by: INTERNAL MEDICINE

## 2018-08-21 PROCEDURE — 82570 ASSAY OF URINE CREATININE: CPT | Performed by: INTERNAL MEDICINE

## 2018-08-21 PROCEDURE — 83970 ASSAY OF PARATHORMONE: CPT | Performed by: INTERNAL MEDICINE

## 2018-08-21 PROCEDURE — 84540 ASSAY OF URINE/UREA-N: CPT | Performed by: INTERNAL MEDICINE

## 2018-08-21 PROCEDURE — 84100 ASSAY OF PHOSPHORUS: CPT | Performed by: INTERNAL MEDICINE

## 2018-08-21 PROCEDURE — 82565 ASSAY OF CREATININE: CPT | Performed by: INTERNAL MEDICINE

## 2018-08-21 PROCEDURE — 84300 ASSAY OF URINE SODIUM: CPT | Performed by: INTERNAL MEDICINE

## 2018-08-22 PROBLEM — I70.0 THORACIC AORTA ATHEROSCLEROSIS: Status: ACTIVE | Noted: 2018-08-22

## 2018-08-22 PROBLEM — I70.0 THORACIC AORTA ATHEROSCLEROSIS (HCC): Status: ACTIVE | Noted: 2018-08-22

## 2018-12-17 PROCEDURE — 36415 COLL VENOUS BLD VENIPUNCTURE: CPT | Performed by: INTERNAL MEDICINE

## 2018-12-17 PROCEDURE — 82570 ASSAY OF URINE CREATININE: CPT | Performed by: INTERNAL MEDICINE

## 2018-12-17 PROCEDURE — 82043 UR ALBUMIN QUANTITATIVE: CPT | Performed by: INTERNAL MEDICINE

## 2019-01-21 ENCOUNTER — LAB ENCOUNTER (OUTPATIENT)
Dept: LAB | Age: 69
End: 2019-01-21
Attending: INTERNAL MEDICINE
Payer: MEDICARE

## 2019-01-21 DIAGNOSIS — E11.65 TYPE 2 DIABETES MELLITUS WITH HYPERGLYCEMIA (HCC): Primary | ICD-10-CM

## 2019-01-21 PROBLEM — Z95.0 S/P PLACEMENT OF CARDIAC PACEMAKER: Status: ACTIVE | Noted: 2019-01-21

## 2019-01-21 PROBLEM — I48.0 PAROXYSMAL ATRIAL FIBRILLATION (HCC): Status: ACTIVE | Noted: 2019-01-21

## 2019-01-21 PROBLEM — I44.1 SECOND DEGREE AV BLOCK: Status: ACTIVE | Noted: 2019-01-21

## 2019-01-21 LAB
ALBUMIN SERPL-MCNC: 3.8 G/DL (ref 3.1–4.5)
ALBUMIN/GLOB SERPL: 1.1 {RATIO} (ref 1–2)
ALP LIVER SERPL-CCNC: 98 U/L (ref 45–117)
ALT SERPL-CCNC: 31 U/L (ref 17–63)
ANION GAP SERPL CALC-SCNC: 8 MMOL/L (ref 0–18)
AST SERPL-CCNC: 21 U/L (ref 15–41)
BILIRUB SERPL-MCNC: 0.3 MG/DL (ref 0.1–2)
BUN BLD-MCNC: 29 MG/DL (ref 8–20)
BUN/CREAT SERPL: 23.4 (ref 10–20)
CALCIUM BLD-MCNC: 9.3 MG/DL (ref 8.3–10.3)
CHLORIDE SERPL-SCNC: 108 MMOL/L (ref 101–111)
CO2 SERPL-SCNC: 23 MMOL/L (ref 22–32)
CREAT BLD-MCNC: 1.24 MG/DL (ref 0.7–1.3)
GLOBULIN PLAS-MCNC: 3.5 G/DL (ref 2.8–4.4)
GLUCOSE BLD-MCNC: 164 MG/DL (ref 70–99)
M PROTEIN MFR SERPL ELPH: 7.3 G/DL (ref 6.4–8.2)
OSMOLALITY SERPL CALC.SUM OF ELEC: 297 MOSM/KG (ref 275–295)
POTASSIUM SERPL-SCNC: 4.3 MMOL/L (ref 3.6–5.1)
SODIUM SERPL-SCNC: 139 MMOL/L (ref 136–144)
T4 FREE SERPL-MCNC: 0.8 NG/DL (ref 0.9–1.8)
TSI SER-ACNC: 1.9 MIU/ML (ref 0.35–5.5)
VIT D+METAB SERPL-MCNC: 18.7 NG/ML (ref 30–100)

## 2019-01-21 PROCEDURE — 82306 VITAMIN D 25 HYDROXY: CPT

## 2019-01-21 PROCEDURE — 84443 ASSAY THYROID STIM HORMONE: CPT

## 2019-01-21 PROCEDURE — 80053 COMPREHEN METABOLIC PANEL: CPT

## 2019-01-21 PROCEDURE — 84439 ASSAY OF FREE THYROXINE: CPT

## 2019-01-21 PROCEDURE — 84153 ASSAY OF PSA TOTAL: CPT | Performed by: UROLOGY

## 2019-01-21 PROCEDURE — 84154 ASSAY OF PSA FREE: CPT | Performed by: UROLOGY

## 2019-02-20 PROBLEM — E11.621 DIABETIC FOOT ULCER ASSOCIATED WITH TYPE 2 DIABETES MELLITUS, UNSPECIFIED LATERALITY, UNSPECIFIED PART OF FOOT, UNSPECIFIED ULCER STAGE (HCC): Status: ACTIVE | Noted: 2019-02-20

## 2019-02-20 PROBLEM — L97.509 DIABETIC FOOT ULCER ASSOCIATED WITH TYPE 2 DIABETES MELLITUS, UNSPECIFIED LATERALITY, UNSPECIFIED PART OF FOOT, UNSPECIFIED ULCER STAGE (HCC): Status: ACTIVE | Noted: 2019-02-20

## 2019-12-17 PROBLEM — M75.102 ROTATOR CUFF SYNDROME, LEFT: Status: ACTIVE | Noted: 2019-12-17

## 2020-02-05 PROCEDURE — 88304 TISSUE EXAM BY PATHOLOGIST: CPT | Performed by: SURGERY

## 2020-10-30 PROBLEM — N18.30 ANEMIA IN STAGE 3 CHRONIC KIDNEY DISEASE, UNSPECIFIED WHETHER STAGE 3A OR 3B CKD (HCC): Status: ACTIVE | Noted: 2020-10-30

## 2020-10-30 PROBLEM — D63.1: Status: ACTIVE | Noted: 2020-10-30

## 2020-10-30 PROBLEM — D63.1 ANEMIA IN STAGE 3 CHRONIC KIDNEY DISEASE, UNSPECIFIED WHETHER STAGE 3A OR 3B CKD (HCC): Status: ACTIVE | Noted: 2020-10-30

## 2020-10-30 PROBLEM — N18.30: Status: ACTIVE | Noted: 2020-10-30

## 2021-02-05 PROBLEM — F32.0 CURRENT MILD EPISODE OF MAJOR DEPRESSIVE DISORDER, UNSPECIFIED WHETHER RECURRENT: Status: ACTIVE | Noted: 2021-02-05

## 2021-02-05 PROBLEM — F32.0 CURRENT MILD EPISODE OF MAJOR DEPRESSIVE DISORDER, UNSPECIFIED WHETHER RECURRENT (HCC): Status: ACTIVE | Noted: 2021-02-05

## 2021-02-26 ENCOUNTER — LAB ENCOUNTER (OUTPATIENT)
Dept: LAB | Age: 71
End: 2021-02-26
Attending: INTERNAL MEDICINE
Payer: MEDICARE

## 2021-02-26 DIAGNOSIS — R94.39 ABNORMAL STRESS TEST: ICD-10-CM

## 2021-02-27 LAB — SARS-COV-2 RNA RESP QL NAA+PROBE: NOT DETECTED

## 2021-03-01 ENCOUNTER — HOSPITAL ENCOUNTER (OUTPATIENT)
Dept: INTERVENTIONAL RADIOLOGY/VASCULAR | Facility: HOSPITAL | Age: 71
Discharge: HOME | End: 2021-03-01
Attending: INTERNAL MEDICINE | Admitting: INTERNAL MEDICINE
Payer: MEDICARE

## 2021-03-01 VITALS
WEIGHT: 305 LBS | RESPIRATION RATE: 12 BRPM | HEART RATE: 58 BPM | BODY MASS INDEX: 40.42 KG/M2 | DIASTOLIC BLOOD PRESSURE: 96 MMHG | OXYGEN SATURATION: 99 % | TEMPERATURE: 97 F | HEIGHT: 73 IN | SYSTOLIC BLOOD PRESSURE: 137 MMHG

## 2021-03-01 DIAGNOSIS — R94.39 ABNORMAL STRESS TEST: Primary | ICD-10-CM

## 2021-03-01 DIAGNOSIS — I49.5 SSS (SICK SINUS SYNDROME) (HCC): ICD-10-CM

## 2021-03-01 DIAGNOSIS — I10 HYPERTENSION, UNSPECIFIED TYPE: ICD-10-CM

## 2021-03-01 LAB — GLUCOSE BLD-MCNC: 168 MG/DL (ref 70–99)

## 2021-03-01 PROCEDURE — 82962 GLUCOSE BLOOD TEST: CPT

## 2021-03-01 PROCEDURE — 93458 L HRT ARTERY/VENTRICLE ANGIO: CPT

## 2021-03-01 PROCEDURE — B2111ZZ FLUOROSCOPY OF MULTIPLE CORONARY ARTERIES USING LOW OSMOLAR CONTRAST: ICD-10-PCS | Performed by: INTERNAL MEDICINE

## 2021-03-01 PROCEDURE — 99152 MOD SED SAME PHYS/QHP 5/>YRS: CPT

## 2021-03-01 PROCEDURE — 4A023N7 MEASUREMENT OF CARDIAC SAMPLING AND PRESSURE, LEFT HEART, PERCUTANEOUS APPROACH: ICD-10-PCS | Performed by: INTERNAL MEDICINE

## 2021-03-01 PROCEDURE — B2151ZZ FLUOROSCOPY OF LEFT HEART USING LOW OSMOLAR CONTRAST: ICD-10-PCS | Performed by: INTERNAL MEDICINE

## 2021-03-01 RX ORDER — HEPARIN SODIUM 5000 [USP'U]/ML
INJECTION, SOLUTION INTRAVENOUS; SUBCUTANEOUS
Status: COMPLETED
Start: 2021-03-01 | End: 2021-03-01

## 2021-03-01 RX ORDER — SODIUM CHLORIDE 9 MG/ML
INJECTION, SOLUTION INTRAVENOUS
Status: DISCONTINUED | OUTPATIENT
Start: 2021-03-02 | End: 2021-03-01 | Stop reason: HOSPADM

## 2021-03-01 RX ORDER — VERAPAMIL HYDROCHLORIDE 2.5 MG/ML
INJECTION, SOLUTION INTRAVENOUS
Status: COMPLETED
Start: 2021-03-01 | End: 2021-03-01

## 2021-03-01 RX ORDER — LIDOCAINE HYDROCHLORIDE 10 MG/ML
INJECTION, SOLUTION EPIDURAL; INFILTRATION; INTRACAUDAL; PERINEURAL
Status: COMPLETED
Start: 2021-03-01 | End: 2021-03-01

## 2021-03-01 RX ORDER — NITROGLYCERIN 20 MG/100ML
INJECTION INTRAVENOUS
Status: COMPLETED
Start: 2021-03-01 | End: 2021-03-01

## 2021-03-01 RX ORDER — MIDAZOLAM HYDROCHLORIDE 1 MG/ML
INJECTION INTRAMUSCULAR; INTRAVENOUS
Status: COMPLETED
Start: 2021-03-01 | End: 2021-03-01

## 2021-03-01 NOTE — PROCEDURES
[address] Location: Cath Lab    CSN [id number] MRN [id number]   Admission Date 3/1/2021 Procedure Date 3/1/2021   Attending Physician Lin Alexander MD Procedure Physician Michelle Dewitt, [address] [place] [address] angiography:      Left main artery: The left main artery is a medium caliber, bifurcating vessel without significant angiographic disease.     LAD:  The left anterior descending artery is a medium size vessel that wraps around the apex and gives rise to two

## 2021-03-01 NOTE — H&P
Patient seen and examined independently. H and P by Dr. Russell [facility] dated 2/22/21 reviewed. No changes in H and P. Risks and benefits of procedure were discussed with patient. Airway examined. Patient is ASA class 2 and mallampati class 2.  Pt is appropriate fo Patient

## 2021-03-01 NOTE — PROGRESS NOTES
Pt arrived from lab via bed.see assmt and vitals flowsheet. 1100: discharge instructions reviewed w pt and daughter. IV d/cd. Pt discharged to home without apparent distress. Rt wrist dressing site wnl.  See flowsheet

## 2021-03-29 PROBLEM — M17.0 PRIMARY OSTEOARTHRITIS OF BOTH KNEES: Status: ACTIVE | Noted: 2021-03-29

## 2021-03-29 PROBLEM — M51.37 DEGENERATION OF LUMBOSACRAL INTERVERTEBRAL DISC: Status: ACTIVE | Noted: 2021-03-29

## 2021-03-29 PROBLEM — M51.379 DEGENERATION OF LUMBOSACRAL INTERVERTEBRAL DISC: Status: ACTIVE | Noted: 2021-03-29

## 2021-04-11 DIAGNOSIS — Z23 NEED FOR VACCINATION: ICD-10-CM

## 2021-05-16 ENCOUNTER — HOSPITAL ENCOUNTER (EMERGENCY)
Age: 71
Discharge: HOME | End: 2021-05-16
Attending: EMERGENCY MEDICINE
Payer: MEDICARE

## 2021-05-16 VITALS
RESPIRATION RATE: 18 BRPM | HEIGHT: 73 IN | OXYGEN SATURATION: 98 % | HEART RATE: 74 BPM | SYSTOLIC BLOOD PRESSURE: 153 MMHG | BODY MASS INDEX: 41.75 KG/M2 | WEIGHT: 315 LBS | TEMPERATURE: 98 F | DIASTOLIC BLOOD PRESSURE: 66 MMHG

## 2021-05-16 DIAGNOSIS — S61.217A LACERATION OF LEFT LITTLE FINGER WITHOUT FOREIGN BODY WITHOUT DAMAGE TO NAIL, INITIAL ENCOUNTER: Primary | ICD-10-CM

## 2021-05-16 PROCEDURE — 99283 EMERGENCY DEPT VISIT LOW MDM: CPT

## 2021-05-16 PROCEDURE — 12001 RPR S/N/AX/GEN/TRNK 2.5CM/<: CPT

## 2021-05-16 PROCEDURE — 90471 IMMUNIZATION ADMIN: CPT

## 2021-05-17 NOTE — ED PROVIDER NOTES
Patient Seen in: [facility] Emergency Department In [place]      History   Patient presents with:  Laceration/Abrasion    Stated Complaint: lac to left 5th finger, pt on blood thinners    HPI/Subjective:   HPI    Patient sustained a laceration to his left Resp 18   Ht 185.4 cm (6' 1\")   Wt (!) 145.2 kg   SpO2 98%   BMI 42.22 kg/m²         Physical Exam  Finger: The left fifth digit is remarkable for a avulsion-like laceration measuring a total of about 1 cm over the pad of the finger.   There is a small are

## 2021-05-17 NOTE — ED INITIAL ASSESSMENT (HPI)
Pulling up carpet, pt sts he dragged his hand along the tac strip and cut the right 5th digit. Bleeding controlled at this time. *He is taking Eliquis.

## 2022-01-20 PROBLEM — E11.65 UNCONTROLLED TYPE 2 DIABETES MELLITUS WITH HYPERGLYCEMIA (HCC): Status: ACTIVE | Noted: 2022-01-20

## 2022-01-20 PROBLEM — Z79.4 TYPE 2 DIABETES MELLITUS WITH DIABETIC NEUROPATHY, WITH LONG-TERM CURRENT USE OF INSULIN (HCC): Status: ACTIVE | Noted: 2022-01-20

## 2022-01-20 PROBLEM — Z86.31 HISTORY OF DIABETIC ULCER OF FOOT: Status: ACTIVE | Noted: 2022-01-20

## 2022-01-20 PROBLEM — E11.40 TYPE 2 DIABETES MELLITUS WITH DIABETIC NEUROPATHY, WITH LONG-TERM CURRENT USE OF INSULIN (HCC): Status: ACTIVE | Noted: 2022-01-20

## 2022-01-21 PROBLEM — E11.29 TYPE 2 DIABETES MELLITUS WITH MICROALBUMINURIA, WITH LONG-TERM CURRENT USE OF INSULIN (HCC): Status: ACTIVE | Noted: 2022-01-21

## 2022-01-21 PROBLEM — Z79.4 TYPE 2 DIABETES MELLITUS WITH MICROALBUMINURIA, WITH LONG-TERM CURRENT USE OF INSULIN (HCC): Status: ACTIVE | Noted: 2022-01-21

## 2022-01-21 PROBLEM — R80.9 TYPE 2 DIABETES MELLITUS WITH MICROALBUMINURIA, WITH LONG-TERM CURRENT USE OF INSULIN (HCC): Status: ACTIVE | Noted: 2022-01-21

## 2022-04-06 PROBLEM — M54.2 BILATERAL NECK PAIN: Status: ACTIVE | Noted: 2022-04-06

## 2023-01-17 RX ORDER — DULAGLUTIDE 1.5 MG/.5ML
INJECTION, SOLUTION SUBCUTANEOUS WEEKLY
COMMUNITY

## 2023-01-20 ENCOUNTER — LAB ENCOUNTER (OUTPATIENT)
Dept: LAB | Age: 73
End: 2023-01-20
Attending: INTERNAL MEDICINE
Payer: MEDICARE

## 2023-01-20 DIAGNOSIS — Z01.818 PRE-OP TESTING: ICD-10-CM

## 2023-01-21 LAB — SARS-COV-2 RNA RESP QL NAA+PROBE: NOT DETECTED

## 2023-01-23 ENCOUNTER — ANESTHESIA (OUTPATIENT)
Dept: ENDOSCOPY | Facility: HOSPITAL | Age: 73
End: 2023-01-23
Payer: MEDICARE

## 2023-01-23 ENCOUNTER — ANESTHESIA EVENT (OUTPATIENT)
Dept: ENDOSCOPY | Facility: HOSPITAL | Age: 73
End: 2023-01-23
Payer: MEDICARE

## 2023-01-23 ENCOUNTER — HOSPITAL ENCOUNTER (OUTPATIENT)
Facility: HOSPITAL | Age: 73
Setting detail: HOSPITAL OUTPATIENT SURGERY
Discharge: HOME | End: 2023-01-23
Attending: INTERNAL MEDICINE | Admitting: INTERNAL MEDICINE
Payer: MEDICARE

## 2023-01-23 VITALS
RESPIRATION RATE: 16 BRPM | BODY MASS INDEX: 39.1 KG/M2 | WEIGHT: 295 LBS | OXYGEN SATURATION: 97 % | DIASTOLIC BLOOD PRESSURE: 74 MMHG | HEART RATE: 60 BPM | SYSTOLIC BLOOD PRESSURE: 117 MMHG | HEIGHT: 73 IN | TEMPERATURE: 98 F

## 2023-01-23 DIAGNOSIS — Z01.818 PRE-OP TESTING: Primary | ICD-10-CM

## 2023-01-23 LAB — GLUCOSE BLD-MCNC: 107 MG/DL (ref 70–99)

## 2023-01-23 PROCEDURE — 0DB98ZX EXCISION OF DUODENUM, VIA NATURAL OR ARTIFICIAL OPENING ENDOSCOPIC, DIAGNOSTIC: ICD-10-PCS | Performed by: INTERNAL MEDICINE

## 2023-01-23 PROCEDURE — 88305 TISSUE EXAM BY PATHOLOGIST: CPT | Performed by: INTERNAL MEDICINE

## 2023-01-23 PROCEDURE — 0DBL8ZX EXCISION OF TRANSVERSE COLON, VIA NATURAL OR ARTIFICIAL OPENING ENDOSCOPIC, DIAGNOSTIC: ICD-10-PCS | Performed by: INTERNAL MEDICINE

## 2023-01-23 PROCEDURE — 82962 GLUCOSE BLOOD TEST: CPT

## 2023-01-23 PROCEDURE — 0DB78ZX EXCISION OF STOMACH, PYLORUS, VIA NATURAL OR ARTIFICIAL OPENING ENDOSCOPIC, DIAGNOSTIC: ICD-10-PCS | Performed by: INTERNAL MEDICINE

## 2023-01-23 PROCEDURE — 0DBM8ZX EXCISION OF DESCENDING COLON, VIA NATURAL OR ARTIFICIAL OPENING ENDOSCOPIC, DIAGNOSTIC: ICD-10-PCS | Performed by: INTERNAL MEDICINE

## 2023-01-23 RX ORDER — NICOTINE POLACRILEX 4 MG
30 LOZENGE BUCCAL
Status: DISCONTINUED | OUTPATIENT
Start: 2023-01-23 | End: 2023-01-23

## 2023-01-23 RX ORDER — SODIUM CHLORIDE, SODIUM LACTATE, POTASSIUM CHLORIDE, CALCIUM CHLORIDE 600; 310; 30; 20 MG/100ML; MG/100ML; MG/100ML; MG/100ML
INJECTION, SOLUTION INTRAVENOUS CONTINUOUS
Status: DISCONTINUED | OUTPATIENT
Start: 2023-01-23 | End: 2023-01-23

## 2023-01-23 RX ORDER — NALOXONE HYDROCHLORIDE 0.4 MG/ML
80 INJECTION, SOLUTION INTRAMUSCULAR; INTRAVENOUS; SUBCUTANEOUS AS NEEDED
Status: DISCONTINUED | OUTPATIENT
Start: 2023-01-23 | End: 2023-01-23

## 2023-01-23 RX ORDER — DEXTROSE MONOHYDRATE 25 G/50ML
50 INJECTION, SOLUTION INTRAVENOUS
Status: DISCONTINUED | OUTPATIENT
Start: 2023-01-23 | End: 2023-01-23

## 2023-01-23 RX ORDER — LIDOCAINE HYDROCHLORIDE 10 MG/ML
INJECTION, SOLUTION EPIDURAL; INFILTRATION; INTRACAUDAL; PERINEURAL AS NEEDED
Status: DISCONTINUED | OUTPATIENT
Start: 2023-01-23 | End: 2023-01-23 | Stop reason: SURG

## 2023-01-23 RX ORDER — HYDROMORPHONE HYDROCHLORIDE 1 MG/ML
0.4 INJECTION, SOLUTION INTRAMUSCULAR; INTRAVENOUS; SUBCUTANEOUS EVERY 5 MIN PRN
Status: DISCONTINUED | OUTPATIENT
Start: 2023-01-23 | End: 2023-01-23

## 2023-01-23 RX ORDER — HYDROMORPHONE HYDROCHLORIDE 1 MG/ML
0.2 INJECTION, SOLUTION INTRAMUSCULAR; INTRAVENOUS; SUBCUTANEOUS EVERY 5 MIN PRN
Status: DISCONTINUED | OUTPATIENT
Start: 2023-01-23 | End: 2023-01-23

## 2023-01-23 RX ORDER — HYDROMORPHONE HYDROCHLORIDE 1 MG/ML
0.6 INJECTION, SOLUTION INTRAMUSCULAR; INTRAVENOUS; SUBCUTANEOUS EVERY 5 MIN PRN
Status: DISCONTINUED | OUTPATIENT
Start: 2023-01-23 | End: 2023-01-23

## 2023-01-23 RX ORDER — NICOTINE POLACRILEX 4 MG
15 LOZENGE BUCCAL
Status: DISCONTINUED | OUTPATIENT
Start: 2023-01-23 | End: 2023-01-23

## 2023-01-23 RX ADMIN — SODIUM CHLORIDE, SODIUM LACTATE, POTASSIUM CHLORIDE, CALCIUM CHLORIDE: 600; 310; 30; 20 INJECTION, SOLUTION INTRAVENOUS at 13:48:00

## 2023-01-23 RX ADMIN — SODIUM CHLORIDE, SODIUM LACTATE, POTASSIUM CHLORIDE, CALCIUM CHLORIDE: 600; 310; 30; 20 INJECTION, SOLUTION INTRAVENOUS at 14:36:00

## 2023-01-23 RX ADMIN — LIDOCAINE HYDROCHLORIDE 25 MG: 10 INJECTION, SOLUTION EPIDURAL; INFILTRATION; INTRACAUDAL; PERINEURAL at 13:03:00

## 2023-01-23 NOTE — DISCHARGE INSTRUCTIONS
ENDOSCOPY DISCHARGE INSTRUCTIONS    Procedure Performed:   Gastroscopy and Colonoscopy    Endoscopist: Kathleen Vines MD    Findings:  EGD Findings:  - Moderately tortuous esophagus, esophagus otherwise normal.  - Small hiatus hernia. - Patchy mild erythema in the gastric body, with more moderate erythema with scant friability in the gastric antrum. Biopsied from the body and antrum via cold forceps. - Stomach otherwise normal.  - Mild erythema in the duodenal bulb, duodenum otherwise normal. Biopsied via cold forceps. COL Findings:  - Small non-bleeding internal hemorrhoids. - Sparse small scattered diverticulosis in the sigmoid colon. - Two 8-13mm sessile polyps in the transverse colon, resected via hot snare.  - A 5mm sessile polyp in the descending colon, resected via cold snare. Clipped x1 for PPBP, no bleeding during or after maneuver. - A 4.5cm pedunculated polyp in the descending colon, resected at the base via large hot snare. Clipped x2 for PPBP, no bleeding during or after maneuver. - Colon otherwise normal in setting of fair bowel preparation, no other large lesions noted. Recommendations:  - Await pathology results. - Repeat Colonoscopy for surveillance in 1 years unless symptomatic sooner.  - Role for further routine Colonoscopy surveillance should be determined based on care goals + overall health status when due. Please discuss with your PCP accordingly.  - Return to GI Clinic as scheduled. - May resume Eliquis in 48 hours given large polypectomy + multiple polypectomies today. Will defer to Cardiology and PCP accordingly.  - Return to primary care provider as scheduled. - Findings were discussed with the patient and requested family/acquaintance today following procedure. MEDICATIONS:  May resume Eliquis in 48 hours. May resume all other medications today.     DIET:  Regular    BIOPSIES:  Biopsies were taken (you will be notified of results in 7-10 days)    X-RAYS:   No X-Rays were ordered today        Activity for remainder of today:    REST TODAY  DO NOT drive or operate heavy machinery  DO NOT drink any alcoholic beverages  DO NOT sign any legal documents or make any important decisions  AVOID NSAIDs if possible for 3-5 days following your procedure. After your procedure(s): It is not unusual to feel bloated or gassy . Passing gas and belching is encouraged. Lying on your left side with your knees flexed may relieve the discomfort. A hot pack to the abdomen may also help. After your gastroscopy (upper endoscopy): You may experience a slight sore throat which will subside. Throat lozenges or salt water gargle can be used.     FOLLOW-UP:  Contact the office at [phone] for follow-up appointment is needed or if you develop any of the following:    Severe abdominal pain/discomfort     Excessive bleeding                     Black tarry stool    Difficulty breathing/swallowing      Persistent nausea/vomiting  Fever above 100 degrees or chills

## 2023-12-28 ENCOUNTER — HOSPITAL ENCOUNTER (EMERGENCY)
Age: 73
Discharge: HOME | End: 2023-12-28
Attending: EMERGENCY MEDICINE
Payer: COMMERCIAL

## 2023-12-28 ENCOUNTER — APPOINTMENT (OUTPATIENT)
Dept: GENERAL RADIOLOGY | Age: 73
End: 2023-12-28
Attending: EMERGENCY MEDICINE
Payer: COMMERCIAL

## 2023-12-28 VITALS
TEMPERATURE: 98 F | BODY MASS INDEX: 40.63 KG/M2 | HEIGHT: 72 IN | RESPIRATION RATE: 17 BRPM | WEIGHT: 300 LBS | DIASTOLIC BLOOD PRESSURE: 90 MMHG | HEART RATE: 72 BPM | SYSTOLIC BLOOD PRESSURE: 152 MMHG | OXYGEN SATURATION: 98 %

## 2023-12-28 DIAGNOSIS — M54.50 RIGHT LUMBAR PAIN: Primary | ICD-10-CM

## 2023-12-28 LAB — GLUCOSE BLD-MCNC: 300 MG/DL (ref 70–99)

## 2023-12-28 PROCEDURE — 72100 X-RAY EXAM L-S SPINE 2/3 VWS: CPT | Performed by: EMERGENCY MEDICINE

## 2023-12-28 PROCEDURE — 82962 GLUCOSE BLOOD TEST: CPT

## 2023-12-28 PROCEDURE — 99283 EMERGENCY DEPT VISIT LOW MDM: CPT

## 2023-12-28 PROCEDURE — 99284 EMERGENCY DEPT VISIT MOD MDM: CPT

## 2023-12-28 RX ORDER — LIDOCAINE 50 MG/G
1 PATCH TOPICAL EVERY 24 HOURS
Qty: 10 PATCH | Refills: 0 | Status: SHIPPED | OUTPATIENT
Start: 2023-12-28 | End: 2023-12-28

## 2023-12-28 RX ORDER — HYDROCODONE BITARTRATE AND ACETAMINOPHEN 5; 325 MG/1; MG/1
1 TABLET ORAL ONCE
Status: COMPLETED | OUTPATIENT
Start: 2023-12-28 | End: 2023-12-28

## 2023-12-28 RX ORDER — LIDOCAINE 50 MG/G
1 PATCH TOPICAL EVERY 24 HOURS
Qty: 10 PATCH | Refills: 0 | Status: SHIPPED | OUTPATIENT
Start: 2023-12-28 | End: 2024-01-07

## 2023-12-28 RX ORDER — CYCLOBENZAPRINE HCL 10 MG
5 TABLET ORAL NIGHTLY
Qty: 5 TABLET | Refills: 0 | Status: SHIPPED | OUTPATIENT
Start: 2023-12-28 | End: 2024-01-07
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
## 2024-01-30 RX ORDER — HYDROCODONE BITARTRATE AND ACETAMINOPHEN 10; 325 MG/1; MG/1
1 TABLET ORAL EVERY 8 HOURS PRN
COMMUNITY

## 2024-02-01 ENCOUNTER — ANESTHESIA EVENT (OUTPATIENT)
Dept: ENDOSCOPY | Facility: HOSPITAL | Age: 74
End: 2024-02-01
Payer: MEDICARE

## 2024-02-01 ENCOUNTER — HOSPITAL ENCOUNTER (OUTPATIENT)
Facility: HOSPITAL | Age: 74
Setting detail: HOSPITAL OUTPATIENT SURGERY
Discharge: HOME | End: 2024-02-01
Attending: INTERNAL MEDICINE | Admitting: INTERNAL MEDICINE
Payer: MEDICARE

## 2024-02-01 ENCOUNTER — ANESTHESIA (OUTPATIENT)
Dept: ENDOSCOPY | Facility: HOSPITAL | Age: 74
End: 2024-02-01
Payer: MEDICARE

## 2024-02-01 VITALS
DIASTOLIC BLOOD PRESSURE: 88 MMHG | BODY MASS INDEX: 40.5 KG/M2 | WEIGHT: 299 LBS | HEART RATE: 61 BPM | HEIGHT: 72 IN | RESPIRATION RATE: 18 BRPM | OXYGEN SATURATION: 97 % | SYSTOLIC BLOOD PRESSURE: 153 MMHG | TEMPERATURE: 97 F

## 2024-02-01 LAB — GLUCOSE BLD-MCNC: 123 MG/DL (ref 70–99)

## 2024-02-01 PROCEDURE — 0DBN8ZX EXCISION OF SIGMOID COLON, VIA NATURAL OR ARTIFICIAL OPENING ENDOSCOPIC, DIAGNOSTIC: ICD-10-PCS | Performed by: INTERNAL MEDICINE

## 2024-02-01 PROCEDURE — 82962 GLUCOSE BLOOD TEST: CPT

## 2024-02-01 PROCEDURE — 88305 TISSUE EXAM BY PATHOLOGIST: CPT | Performed by: INTERNAL MEDICINE

## 2024-02-01 PROCEDURE — 0DBL8ZX EXCISION OF TRANSVERSE COLON, VIA NATURAL OR ARTIFICIAL OPENING ENDOSCOPIC, DIAGNOSTIC: ICD-10-PCS | Performed by: INTERNAL MEDICINE

## 2024-02-01 RX ORDER — NICOTINE POLACRILEX 4 MG
15 LOZENGE BUCCAL
Status: DISCONTINUED | OUTPATIENT
Start: 2024-02-01 | End: 2024-02-01

## 2024-02-01 RX ORDER — NICOTINE POLACRILEX 4 MG
30 LOZENGE BUCCAL
Status: DISCONTINUED | OUTPATIENT
Start: 2024-02-01 | End: 2024-02-01

## 2024-02-01 RX ORDER — LIDOCAINE HYDROCHLORIDE 10 MG/ML
INJECTION, SOLUTION EPIDURAL; INFILTRATION; INTRACAUDAL; PERINEURAL AS NEEDED
Status: DISCONTINUED | OUTPATIENT
Start: 2024-02-01 | End: 2024-02-01 | Stop reason: SURG

## 2024-02-01 RX ORDER — NALOXONE HYDROCHLORIDE 0.4 MG/ML
0.08 INJECTION, SOLUTION INTRAMUSCULAR; INTRAVENOUS; SUBCUTANEOUS ONCE AS NEEDED
Status: DISCONTINUED | OUTPATIENT
Start: 2024-02-01 | End: 2024-02-01

## 2024-02-01 RX ORDER — DEXTROSE MONOHYDRATE 25 G/50ML
50 INJECTION, SOLUTION INTRAVENOUS
Status: DISCONTINUED | OUTPATIENT
Start: 2024-02-01 | End: 2024-02-01

## 2024-02-01 RX ORDER — SODIUM CHLORIDE, SODIUM LACTATE, POTASSIUM CHLORIDE, CALCIUM CHLORIDE 600; 310; 30; 20 MG/100ML; MG/100ML; MG/100ML; MG/100ML
INJECTION, SOLUTION INTRAVENOUS CONTINUOUS
Status: DISCONTINUED | OUTPATIENT
Start: 2024-02-01 | End: 2024-02-01

## 2024-02-01 RX ADMIN — LIDOCAINE HYDROCHLORIDE 50 MG: 10 INJECTION, SOLUTION EPIDURAL; INFILTRATION; INTRACAUDAL; PERINEURAL at 10:17:00

## 2024-02-01 NOTE — DISCHARGE INSTRUCTIONS
ENDOSCOPY DISCHARGE INSTRUCTIONS    Procedure Performed:   Colonoscopy    Endoscopist: Jericho Otero MD    Findings:  COL Findings:  - Small non-bleeding Grade 1/2 internal hemorrhoids with hypertrophied anal papilla.  - Sparse diverticulosis in the sigmoid colon.  - Two 4-5mm sessile polyps in the transverse and distal sigmoid colon. Resected via cold snare.  - Colon otherwise normal.    Recommendations:  - Await pathology results.  - May resume anticoagulation (Eliquis) tomorrow.  - Repeat Colonoscopy for surveillance based on pathology results.  - Role for further routine Colonoscopy surveillance should be determined based on care goals + overall health status when due. Please discuss with your PCP accordingly, as further elective surveillance could be deferred.  - Return to primary care provider as advised.  - Findings were discussed with the patient and requested family/acquaintance today following procedure.      MEDICATIONS:  You may resume all other medications today and restart Eliquis tomorrow.    DIET:  Regular    BIOPSIES:  Biopsies were taken (you will be notified of results in 7-10 days)    X-RAYS:   No X-Rays were ordered today        Activity for remainder of today:    REST TODAY  DO NOT drive or operate heavy machinery  DO NOT drink any alcoholic beverages  DO NOT sign any legal documents or make any important decisions  AVOID NSAIDs if possible for 3-5 days following your procedure.    After your procedure(s):  It is not unusual to feel bloated or gassy .  Passing gas and belching is encouraged. Lying on your left side with your knees flexed may relieve the discomfort. A hot pack to the abdomen may also help.    After your gastroscopy (upper endoscopy): You may experience a slight sore throat which will subside. Throat lozenges or salt water gargle can be used.    FOLLOW-UP:  Contact the office at [phone] for follow-up appointment is needed or if you develop any of the following:    Severe  abdominal pain/discomfort     Excessive bleeding                     Black tarry stool    Difficulty breathing/swallowing      Persistent nausea/vomiting  Fever above 100 degrees or chills

## 2024-02-01 NOTE — ANESTHESIA PREPROCEDURE EVALUATION
PRE-OP EVALUATION    Patient Name: Jonatan Gordon    Admit Diagnosis: HISTORY OF COLON POLYPS, CHRONIC ANTICOAGULATION    Pre-op Diagnosis: HISTORY OF COLON POLYPS, CHRONIC ANTICOAGULATION    COLONOSCOPY    Anesthesia Procedure: COLONOSCOPY    Surgeon(s) and Role:     * Jericho Otero MD - Primary    Pre-op vitals reviewed.  Temp: 97.1 °F (36.2 °C)  Pulse: 90  Resp: 22  BP: 153/75  SpO2: 99 %  Body mass index is 40.55 kg/m².    Current medications reviewed.  Hospital Medications:   glucose (Dex4) 15 GM/59ML oral liquid 15 g  15 g Oral Q15 Min PRN    Or    glucose (Glutose) 40% oral gel 15 g  15 g Oral Q15 Min PRN    Or    glucose-vitamin C (Dex-4) chewable tab 4 tablet  4 tablet Oral Q15 Min PRN    Or    dextrose 50% injection 50 mL  50 mL Intravenous Q15 Min PRN    Or    glucose (Dex4) 15 GM/59ML oral liquid 30 g  30 g Oral Q15 Min PRN    Or    glucose (Glutose) 40% oral gel 30 g  30 g Oral Q15 Min PRN    Or    glucose-vitamin C (Dex-4) chewable tab 8 tablet  8 tablet Oral Q15 Min PRN    lactated ringers infusion   Intravenous Continuous       Outpatient Medications:     Medications Prior to Admission   Medication Sig Dispense Refill Last Dose    HYDROcodone-acetaminophen  MG Oral Tab Take 1 tablet by mouth every 8 (eight) hours as needed for Pain.   1/31/2024    Insulin Glargine (TOUJEO SOLOSTAR SC) Inject 65 Units into the skin every morning.   1/31/2024    TERAZOSIN 2 MG Oral Cap TAKE 1 CAPSULE NIGHTLY 90 capsule 0 Past Week    FINASTERIDE 5 MG Oral Tab TAKE 1 TABLET BY MOUTH EVERY DAY 90 tablet 3 Past Week    pregabalin 50 MG Oral Cap Take 2 capsules (100 mg total) by mouth daily. 180 capsule 0 Past Week    ELIQUIS 5 MG Oral Tab TAKE 1 TABLET TWICE A  tablet 0 1/29/2024    colchicine 0.6 MG Oral Tab Take 1 tablet (0.6 mg total) by mouth daily. 90 tablet 0 Past Week    amLODIPine 10 MG Oral Tab Take 1 tablet (10 mg total) by mouth daily. 90 tablet 3 Past Week    allopurinol 300 MG Oral Tab Take 1  tablet (300 mg total) by mouth daily. 90 tablet 3 Past Week    atorvastatin 10 MG Oral Tab Take 1 tablet (10 mg total) by mouth nightly. 90 tablet 3 Past Week    Esomeprazole Magnesium 40 MG Oral Capsule Delayed Release Take 1 capsule (40 mg total) by mouth 2 (two) times daily. 180 capsule 0 Past Week    hydroCHLOROthiazide 25 MG Oral Tab Take 1 tablet (25 mg total) by mouth daily. 90 tablet 3 Past Week    Olmesartan Medoxomil-HCTZ 40-25 MG Oral Tab Take 1 tablet by mouth daily. 90 tablet 3 Past Week    spironolactone 25 MG Oral Tab Take 1 tablet (25 mg total) by mouth daily. 90 tablet 3 Past Week    sertraline 100 MG Oral Tab Take 1.5 tablets (150 mg total) by mouth daily. 270 tablet 3 Past Week    HYDRALAZINE  MG Oral Tab TAKE 1 TABLET 3 TIMES A  tablet 3 Past Week    insulin aspart (NOVOLOG) 100 UNIT/ML Subcutaneous Solution Inject into the skin 3 (three) times daily before meals. Per SS    2024    [] lidocaine 5 % External Patch Place 1 patch onto the skin daily for 10 days. 10 patch 0     albuterol 108 (90 Base) MCG/ACT Inhalation Aero Soln Inhale 2 puffs into the lungs every 4 (four) hours as needed for Wheezing or Shortness of Breath. 1 each 5 More than a month       Allergies: Aspirin, Ibuprofen, and Mold      Anesthesia Evaluation    Patient summary reviewed.    Anesthetic Complications           GI/Hepatic/Renal      (+) GERD                           Cardiovascular      ECG reviewed.          (+) obesity  (+) hypertension   (+) hyperlipidemia  (+) CAD      (+) pacemaker/AICD                          Endo/Other      (+) diabetes  type 2,                   (+) arthritis       Pulmonary      (+) asthma              (+) sleep apnea       Neuro/Psych      (+) depression                                Past Surgical History:   Procedure Laterality Date    CARDIAC PACEMAKER PLACEMENT      CATARACT      COLONOSCOPY      COLONOSCOPY N/A 2023    Procedure: COLONOSCOPY with hot snare  polypectomy, cold snare polypectomy and endoclipping x3;  Surgeon: Jericho Otero MD;  Location:  ENDOSCOPY    OTHER SURGICAL HISTORY  1991    bladder cancer    OTHER SURGICAL HISTORY  2020    cyst on leg    OTHER SURGICAL HISTORY  2015    rectum    OTHER SURGICAL HISTORY  11/2009    Cataract surgery both eyes    OTHER SURGICAL HISTORY  09/03/2012    right knee      Social History     Socioeconomic History    Marital status:    Tobacco Use    Smoking status: Never    Smokeless tobacco: Never   Vaping Use    Vaping Use: Never used   Substance and Sexual Activity    Alcohol use: No    Drug use: No     History   Drug Use No     Available pre-op labs reviewed.               Airway      Mallampati: III  Mouth opening: >3 FB  TM distance: > 6 cm  Neck ROM: full Cardiovascular    Cardiovascular exam normal.         Dental    Dentition appears grossly intact         Pulmonary    Pulmonary exam normal.                 Other findings              ASA: 3   Plan: MAC  NPO status verified and patient meets guidelines.    Post-procedure pain management plan discussed with surgeon and patient.      Plan/risks discussed with: patient                Present on Admission:  **None**

## 2024-02-01 NOTE — H&P
History and Physical for Endoscopic Procedure      Jonatan Gordon Patient Status:  Hospital Outpatient Surgery    10/29/1950 MRN [id number]   Location [facility] ENDOSCOPY PAIN CENTER Attending Jericho Otero MD   Hosp Day # 0 PCP Leo Rios MD     Date of Consult:  2024     Reason for Consultation:  H/o colon polyps, COL 2023 w/ large TVA w/ HGD, chronic anticoagulation    History of Present Illness:  Jonatan Gordon is a a(n) 73 year old male.     History:  Past Medical History:   Diagnosis Date    ALLERGIC RHINITIS     Anxiety state     Arrhythmia     Arthritis     Asthma     Atherosclerosis of coronary artery     Back problem     back pain    BPH     CANCER     bladder    Cancer (HCC)     bladder cancer in     Depression     DIABETES     Diabetes (HCC)     Esophageal reflux     Essential hypertension     Extrinsic asthma, unspecified     GERD     GOUT     High blood pressure     High cholesterol     Hx of motion sickness     Hyperlipidemia     HYPERTENSION     OBESITY     SLEEP APNEA     BiPAP     Sleep apnea     bipap    Slow urinary stream      Past Surgical History:   Procedure Laterality Date    CARDIAC PACEMAKER PLACEMENT      CATARACT      COLONOSCOPY      COLONOSCOPY N/A 2023    Procedure: COLONOSCOPY with hot snare polypectomy, cold snare polypectomy and endoclipping x3;  Surgeon: Jericho Otero MD;  Location:  ENDOSCOPY    OTHER SURGICAL HISTORY      bladder cancer    OTHER SURGICAL HISTORY      cyst on leg    OTHER SURGICAL HISTORY      rectum    OTHER SURGICAL HISTORY  2009    Cataract surgery both eyes    OTHER SURGICAL HISTORY  2012    right knee      Family History   Problem Relation Age of Onset    Heart Disorder Father     Hypertension Father     Lipids Father     Heart Disorder Mother     Diabetes Mother     Hypertension Mother     Lipids Sister     Hypertension Sister       reports that he has never smoked. He has never used  smokeless tobacco. He reports that he does not drink alcohol and does not use drugs.    Allergies:  Allergies   Allergen Reactions    Aspirin ANAPHYLAXIS and RASH     STOPS BREATHING    Ibuprofen ANAPHYLAXIS    Mold Runny nose       Medications:  No current facility-administered medications for this encounter.    Review of Systems:  Gastrointestinal: negative other than specified in the HPI  General: negative other than specified in the HPI  Neurological: negative other than specified in the HPI  Cardiovascular: negative other than specified in the HPI  Respiratory: negative other than specified in the HPI    Physical Exam:  No acute distress  RRR  CTA B/L  SOFT +BS    Assessment/Plan:  Patient Active Problem List   Diagnosis    Cataract    Allergic rhinitis due to pollen    Allergic rhinitis due to other allergen    Allergic rhinitis due to animal (cat) (dog) hair and dander    Esophageal reflux    Unspecified asthma(493.90)    Mixed hyperlipidemia    Diabetic foot ulcer with osteomyelitis ([facility])    Morbid obesity ([facility])    History of bladder cancer    Precordial pain    Essential hypertension    Puncture wound of foot, left, initial encounter    Chronic bilateral low back pain without sciatica    Chondrocalcinosis of right knee    Primary osteoarthritis of right hip    Trochanteric bursitis, right hip    Primary osteoarthritis of right knee    Other osteoarthritis of spine, lumbar region    Impaired gait and mobility    Generalized polyneuropathy    Cellulitis    Cellulitis, unspecified cellulitis site    Azotemia    Symptomatic bradycardia    Light headed    Chest pain of uncertain etiology    Cardiac pacemaker in situ    Thoracic aorta atherosclerosis ([facility])    Paroxysmal atrial fibrillation ([facility])    Second degree AV block    S/P placement of cardiac pacemaker    Diabetic foot ulcer associated with type 2 diabetes mellitus, unspecified laterality, unspecified part of foot, unspecified ulcer stage ([facility])    Rotator  cuff syndrome, left    Anemia in stage 3 chronic kidney disease, unspecified whether stage 3a or 3b CKD  (HCC)    Current mild episode of major depressive disorder, unspecified whether recurrent (HCC)    Primary osteoarthritis of both knees    Degeneration of lumbosacral intervertebral disc    Uncontrolled type 2 diabetes mellitus with hyperglycemia (HCC)    Type 2 diabetes mellitus with diabetic neuropathy, with long-term current use of insulin (HCC)    History of diabetic ulcer of foot    Type 2 diabetes mellitus with microalbuminuria, with long-term current use of insulin (HCC)    Bilateral neck pain       COL today (a/c held)    Jericho Otero MD  2/1/2024  9:15 AM

## 2024-02-01 NOTE — OPERATIVE REPORT
Colonoscopy Operative Report    Jonatan Gordon Patient Status:  [facility] Outpatient Surgery    10/29/1950 MRN [id number]   [facility] ENDOSCOPY PAIN CENTER Attending Jericho Otero MD   Hosp Day #   0 PCP Leo Rios MD     Pre-Operative Diagnosis: H/o colon polyps, large TVA w/ HGD on COL 2023, chronic anticoagulation    Post-Operative Diagnosis:  - Small non-bleeding Grade 1/2 internal hemorrhoids with hypertrophied anal papilla.  - Sparse diverticulosis in the sigmoid colon.  - Two 4-5mm sessile polyps in the transverse and distal sigmoid colon. Resected via cold snare.  - Colon otherwise normal.    Procedure Performed: COLONOSCOPY       Informed Consent: Informed consent for both the procedure and sedation were obtained from the patient. The potentially life-threatening complications of sedation, bleeding,  perforation, transfusion or repeat endoscopy  were reviewed along with the possible need for hospitalization, surgical management, transfusion or repeat endoscopy should one of these complications arise. The patient understands and is agreeable to proceed.    Sedation Type: MAC. Patient received sedation with monitored anesthesia provided by an Anesthesiologist.  Moderate Sedation Time: None. Deep sedation provided by Anesthesia.    Cecum Withdrawal Time:  10 minutes    Date of previous colonoscopy:     Procedure Description: The patient was placed in the left lateral decubitus position.  After careful digital rectal examination, the Adult colonoscope was inserted into the rectum and advanced to the level of the cecum under direct visualization. The cecum was identified by landmarks, including the appendiceal orifice and ileocecal valve. Careful examination of the entire colon was performed during withdrawal of the endoscope. The scope was withdrawn to the rectum and retroflexion was performed.  The patient tolerated the procedure  well with no immediate complications. The patient was transferred to the recovery area in stable condition.    Quality of Preparation: Adequate    Aronchick Bowel Prep Scale:  1    Complications:  No immediate complications noted.    COL Findings:  - Small non-bleeding Grade 1/2 internal hemorrhoids with hypertrophied anal papilla.  - Sparse diverticulosis in the sigmoid colon.  - Two 4-5mm sessile polyps in the transverse and distal sigmoid colon. Resected via cold snare.  - Colon otherwise normal.    Recommendations:  - Await pathology results.  - May resume anticoagulation (Eliquis) tomorrow.  - Repeat Colonoscopy for surveillance based on pathology results.  - Role for further routine Colonoscopy surveillance should be determined based on care goals + overall health status when due. Please discuss with your PCP accordingly, as further elective surveillance could be deferred.  - Return to primary care provider as advised.  - Findings were discussed with the patient and requested family/acquaintance today following procedure.    Discharge:  The patient was given an after visit summary detailing the procedure, findings, recommendations and follow up plans.     Jericho Otero MD  2/1/2024  9:38 AM

## 2024-02-01 NOTE — ANESTHESIA POSTPROCEDURE EVALUATION
[facility]    Jonatan Gordon Patient Status:  Hospital Outpatient Surgery   Age/Gender 73 year old male MRN [id number]   Location [facility] ENDOSCOPY PAIN CENTER Attending Jericho Otero MD   Hosp Day # 0 PCP Leo Rios MD       Anesthesia Post-op Note    COLONOSCOPY WITH COLD SNARE POLYPECTOMY    Procedure Summary       Date: 02/01/24 Room / Location:  ENDOSCOPY 03 / [facility] ENDOSCOPY    Anesthesia Start: 1016 Anesthesia Stop:     Procedure: COLONOSCOPY WITH COLD SNARE POLYPECTOMY Diagnosis: (COLON POLYPS, DIVERTICULOSIS, HEMORRHOIDS)    Surgeons: Jericho Otero MD Anesthesiologist: Leroy Pickett MD    Anesthesia Type: MAC ASA Status: 3            Anesthesia Type: MAC    Vitals Value Taken Time   /67 02/01/24 1038   Temp  02/01/24 1039   Pulse 61 02/01/24 1038   Resp 18 02/01/24 1038   SpO2 98 % 02/01/24 1038       Patient Location: Endoscopy    Anesthesia Type: MAC    Airway Patency: patent    Postop Pain Control: adequate    Mental Status: mildly sedated but able to meaningfully participate in the post-anesthesia evaluation    Nausea/Vomiting: none    Cardiopulmonary/Hydration status: stable euvolemic    Complications: no apparent anesthesia related complications    Postop vital signs: stable    Dental Exam: Unchanged from Preop    Patient to be discharged home when criteria met.

## 2024-04-08 RX ORDER — ALLOPURINOL 300 MG/1
300 TABLET ORAL DAILY
Qty: 90 TABLET | Refills: 0 | OUTPATIENT
Start: 2024-04-08

## 2024-04-08 NOTE — TELEPHONE ENCOUNTER
Routing to provider per protocol.   allopurinol 300 MG Oral Tab   Last refilled on 1/11/22 for #90  with 3 rf.   Last labs 12/27/23.   Last seen on 1/18/24.     No future appointments.       Thank you.

## 2024-05-05 ENCOUNTER — APPOINTMENT (OUTPATIENT)
Dept: ULTRASOUND IMAGING | Age: 74
End: 2024-05-05
Attending: EMERGENCY MEDICINE
Payer: MEDICARE

## 2024-05-05 ENCOUNTER — HOSPITAL ENCOUNTER (EMERGENCY)
Age: 74
Discharge: HOME | End: 2024-05-05
Attending: EMERGENCY MEDICINE
Payer: MEDICARE

## 2024-05-05 VITALS
HEIGHT: 71 IN | DIASTOLIC BLOOD PRESSURE: 96 MMHG | RESPIRATION RATE: 18 BRPM | TEMPERATURE: 98 F | WEIGHT: 300 LBS | SYSTOLIC BLOOD PRESSURE: 181 MMHG | OXYGEN SATURATION: 96 % | HEART RATE: 90 BPM | BODY MASS INDEX: 42 KG/M2

## 2024-05-05 DIAGNOSIS — N64.4 BREAST PAIN: Primary | ICD-10-CM

## 2024-05-05 PROCEDURE — 76642 ULTRASOUND BREAST LIMITED: CPT | Performed by: EMERGENCY MEDICINE

## 2024-05-05 PROCEDURE — 99284 EMERGENCY DEPT VISIT MOD MDM: CPT

## 2024-05-05 PROCEDURE — 99283 EMERGENCY DEPT VISIT LOW MDM: CPT

## 2024-05-06 NOTE — ED INITIAL ASSESSMENT (HPI)
PT to the ED for evaluation of tenderness and swelling to the L breast. PT reports it feels hard in comparison to the R. Pt has a mammogram and US scheduled for the 17th, but the swelling and pain is getting worse.

## 2024-05-06 NOTE — ED PROVIDER NOTES
Patient Seen in: [facility] In [place]      History     Chief Complaint   Patient presents with    Breast Problem     Stated Complaint: pain, tenderness and swelling to the L chest. US/mammogram scheduled for the 17*    Subjective:   HPI    73-year-old male with a history of asthma, history of diabetes, hypertension, asthma, morbid obesity, gout, chronic anticoagulation, atrial fibrillation who presents to the emergency department for complaints of breast pain.  Patient complains of having left breast pain for the last several days.  He was seen by his primary care doctor and an outpatient mammogram and ultrasound has been scheduled but patient does not feel that he can wait for this.  He denies any fevers or chills.  Denies any swelling of the breast but he feels a firmness underneath it.  He denies any bloody discharge or purulent drainage from the nipple.  He has had no complaints of any weight loss.  No complaints of night sweats or fevers.  No cachexia.  No evidence of lymphadenopathy.  He has no prior history of malignancy noted.    Objective:   Past Medical History:    ALLERGIC RHINITIS    Anxiety state    Arrhythmia    Arthritis    Asthma (HCC)    Atherosclerosis of coronary artery    Back problem    back pain    BPH    CANCER    bladder    Cancer (HCC)    bladder cancer in 1991    Depression    DIABETES    Diabetes (HCC)    Esophageal reflux    Essential hypertension    Extrinsic asthma, unspecified    GERD    GOUT    High blood pressure    High cholesterol    Hx of motion sickness    Hyperlipidemia    HYPERTENSION    OBESITY    SLEEP APNEA    BiPAP 25/19    Sleep apnea    bipap    Slow urinary stream              Past Surgical History:   Procedure Laterality Date    Cardiac pacemaker placement      Cataract      Colonoscopy      Colonoscopy N/A 1/23/2023    Procedure: COLONOSCOPY with hot snare polypectomy, cold snare polypectomy and endoclipping x3;  Surgeon: Jericho Otero MD;   Location:  ENDOSCOPY    Colonoscopy N/A 2/1/2024    Procedure: COLONOSCOPY WITH COLD SNARE POLYPECTOMY;  Surgeon: Jericho Otero MD;  Location:  ENDOSCOPY    Other surgical history  1991    bladder cancer    Other surgical history  2020    cyst on leg    Other surgical history  2015    rectum    Other surgical history  11/2009    Cataract surgery both eyes    Other surgical history  09/03/2012    right knee                 Social History     Socioeconomic History    Marital status:    Tobacco Use    Smoking status: Never     Passive exposure: Never    Smokeless tobacco: Never   Vaping Use    Vaping status: Never Used   Substance and Sexual Activity    Alcohol use: No    Drug use: No              Review of Systems    Positive for stated complaint: pain, tenderness and swelling to the L chest. US/mammogram scheduled for the 17*  Other systems are as noted in HPI.  Constitutional and vital signs reviewed.      All other systems reviewed and negative except as noted above.    Physical Exam     ED Triage Vitals [05/05/24 2124]   BP (!) 181/96   Pulse 90   Resp 18   Temp 97.9 °F (36.6 °C)   Temp src Temporal   SpO2 96 %   O2 Device None (Room air)       Current:BP (!) 181/96   Pulse 90   Temp 97.9 °F (36.6 °C) (Temporal)   Resp 18   Ht 180.3 cm (5' 11\")   Wt 136.1 kg   SpO2 96%   BMI 41.84 kg/m²         Physical Exam    General: Alert and oriented. No acute distress.  HEENT: Normocephalic. No evidence of trauma. Extraocular movements are intact.  Cardiovascular exam: Regular rate and rhythm  Lungs: Clear to auscultation bilaterally.  He does have tenderness to palpation to the left breast tissue but I do not appreciate a discrete mass, cystic structure erythema or induration.  There is no discharge that can be expressed from the nipple.    Abdomen: Soft, nondistended, nontender.  Extremities: No evidence of deformity. No clubbing or cyanosis.  Neuro: No focal deficit is noted.  Chest: There is no  visible asymmetry between the left and right breast.    ED Course   Labs Reviewed - No data to display  Differential diagnosis includes of left breast malignancy versus cellulitis versus abscess versus benign mass  History is provided by the patient  Past medical history includes diabetes hypertension sleep apnea asthma gout atrial fibrillation chronic anticoagulation coronary artery disease  Social history negative for smoker alcohol abuse  Family history positive for coronary artery disease hypertension hyperlipidemia  Left breast ultrasound has been ordered for further evaluation  Showed no acute finding.  Radiologist interpretation was reviewed:      US BREAST LEFT LIMITED (CPT=76642) (Final result)  Result time 05/05/24 23:01:03  Final result by Tone Albright MD (05/05/24 23:01:03)                Impression:    CONCLUSION:  Normal ultrasound of the upper inner quadrant of the left breast.  Correlation with mammography is recommended.     BI-RADS CATEGORY:  DIAGNOSTIC CATEGORY 0--INCOMPLETE ASSESSMENT: NEED ADDITIONAL IMAGING EVALUATION.                     Findings were discussed with the patient and wife at bedside.  He will be discharged home to follow-up with his primary care doctor and have an outpatient mammogram.       MDM      Patient was screened and evaluated during this visit.   As a treating physician attending to the patient, I determined, within reasonable clinical confidence and prior to discharge, that an emergency medical condition was not or was no longer present.  There was no indication for further evaluation, treatment or admission on an emergency basis.  Comprehensive verbal and written discharge and follow-up instructions were provided to help prevent relapse or worsening.  Patient was instructed to follow-up with her primary care provider for further evaluation and treatment, but to return immediately to the ER for worsening, concerning, new, changing or persisting symptoms.  I discussed  the case with the patient and they had no questions, complaints, or concerns.  Patient felt comfortable going home.    ^^Please note that this report has been produced using speech recognition software and may contain errors related to that system including, but not limited to, errors in grammar, punctuation, and spelling, as well as words and phrases that possibly may have been recognized inappropriately.  If there are any questions or concerns, contact the dictating provider for clarification                                   Medical Decision Making      Disposition and Plan     Clinical Impression:  1. Breast pain         Disposition:  Discharge  5/5/2024 11:26 pm    Follow-up:  No follow-up provider specified.        Medications Prescribed:  Current Discharge Medication List

## 2024-05-12 DIAGNOSIS — Z09 HOSPITAL DISCHARGE FOLLOW-UP: ICD-10-CM

## 2024-05-12 DIAGNOSIS — I10 ESSENTIAL HYPERTENSION: ICD-10-CM

## 2024-05-12 DIAGNOSIS — Z95.0 S/P PLACEMENT OF CARDIAC PACEMAKER: ICD-10-CM

## 2024-05-12 DIAGNOSIS — I49.5 SSS (SICK SINUS SYNDROME) (HCC): ICD-10-CM

## 2024-05-12 DIAGNOSIS — E66.01 MORBID OBESITY DUE TO EXCESS CALORIES (HCC): ICD-10-CM

## 2024-05-13 RX ORDER — AMLODIPINE BESYLATE 10 MG/1
10 TABLET ORAL DAILY
Qty: 90 TABLET | Refills: 0 | OUTPATIENT
Start: 2024-05-13

## 2024-05-13 RX ORDER — SERTRALINE HYDROCHLORIDE 100 MG/1
150 TABLET, FILM COATED ORAL DAILY
Qty: 135 TABLET | Refills: 3 | OUTPATIENT
Start: 2024-05-13

## 2024-07-16 RX ORDER — OLMESARTAN MEDOXOMIL AND HYDROCHLOROTHIAZIDE 40/25 40; 25 MG/1; MG/1
1 TABLET ORAL DAILY
Qty: 90 TABLET | Refills: 2 | OUTPATIENT
Start: 2024-07-16

## 2024-07-16 NOTE — TELEPHONE ENCOUNTER
Hypertension Medications Protocol [id number]/16/2024 07:07 AM   Protocol Details CMP or BMP in past 12 months    Last BP reading less than 140/90    In person appointment or virtual visit in the past 12 mos or appointment in next 3 mos    EGFRCR or GFRNAA > 50       LOV 2/2/24     Last Refill   Olmesartan Medoxomil-HCTZ 40-25 MG Oral Tab 90 tablet 3 1/11/2022         Labs 3/25/24    No future appointments.

## 2025-03-01 ENCOUNTER — HOSPITAL ENCOUNTER (EMERGENCY)
Age: 75
Discharge: HOME | End: 2025-03-01
Attending: EMERGENCY MEDICINE
Payer: MEDICARE

## 2025-03-01 ENCOUNTER — APPOINTMENT (OUTPATIENT)
Dept: GENERAL RADIOLOGY | Age: 75
End: 2025-03-01
Attending: PHYSICIAN ASSISTANT
Payer: MEDICARE

## 2025-03-01 VITALS
TEMPERATURE: 99 F | HEART RATE: 77 BPM | OXYGEN SATURATION: 97 % | HEIGHT: 72 IN | SYSTOLIC BLOOD PRESSURE: 157 MMHG | RESPIRATION RATE: 17 BRPM | DIASTOLIC BLOOD PRESSURE: 91 MMHG | BODY MASS INDEX: 42.66 KG/M2 | WEIGHT: 315 LBS

## 2025-03-01 DIAGNOSIS — J11.1 INFLUENZA: Primary | ICD-10-CM

## 2025-03-01 LAB
GLUCOSE BLD-MCNC: 228 MG/DL (ref 70–99)
POCT INFLUENZA A: POSITIVE
POCT INFLUENZA B: NEGATIVE
SARS-COV-2 RNA RESP QL NAA+PROBE: NOT DETECTED

## 2025-03-01 PROCEDURE — 99284 EMERGENCY DEPT VISIT MOD MDM: CPT

## 2025-03-01 PROCEDURE — 71046 X-RAY EXAM CHEST 2 VIEWS: CPT | Performed by: PHYSICIAN ASSISTANT

## 2025-03-01 PROCEDURE — 82962 GLUCOSE BLOOD TEST: CPT

## 2025-03-01 PROCEDURE — 87502 INFLUENZA DNA AMP PROBE: CPT | Performed by: PHYSICIAN ASSISTANT

## 2025-03-01 RX ORDER — BENZONATATE 100 MG/1
100 CAPSULE ORAL 3 TIMES DAILY PRN
Qty: 30 CAPSULE | Refills: 0 | Status: SHIPPED | OUTPATIENT
Start: 2025-03-01 | End: 2025-03-31

## 2025-03-01 RX ORDER — ALBUTEROL SULFATE 90 UG/1
2 INHALANT RESPIRATORY (INHALATION) EVERY 4 HOURS PRN
Qty: 1 EACH | Refills: 0 | Status: SHIPPED | OUTPATIENT
Start: 2025-03-01 | End: 2025-03-31

## 2025-03-01 NOTE — ED INITIAL ASSESSMENT (HPI)
73 y/o M arrives to ED with c/o fever, runny nose, and coughing two days ago. Patient reports he is 3 doses into tamiflu as his wife is flu positive and at [facility] right now. Patient reports his blood sugar dropped into the 50s last night but came back up after drinking some orange juice.

## 2025-03-01 NOTE — DISCHARGE INSTRUCTIONS
1 g of Tylenol every 6 hours.  Push clear fluids.  Quarantine.  2 puffs of inhaler every 3-4 hours.  Tessalon for cough suppression.  Influenza typically last 7 to 10 days.

## 2025-03-01 NOTE — ED PROVIDER NOTES
I have personally reviewed the case with the PA as well as completed a HPI and agree with the care and treatment plan put forth. I provided  a substansive portion of care for this patient.  I personally performed tthe medical decision making for this encounter.      HEENT; NCAT, EOMI, throat clear, neck supple, no LAD, no JVD  Heart S1S2 RRR  lungs: CTAB  abd: Soft NT, ND,  NABS without rebound or guarding  Ext no C/C/E    While here the patient had a chest x-ray on that appears interpreted no acute cardiopulmonary process.  I read the radiology report as well.    Differential diagnosis includes influenza, COVID, pneumonia but not limited such.    This time the patient be discharged home with outpatient management for his influenza.  He is on Tamiflu at this time.      This note was prepared using Dragon Medical voice recognition dictation software.  As a result errors may occur.  When identified to these areas have been corrected.  While every attempt is made to correct errors during dictation discrepancies may still exist.  Please contact if there are any errors.

## 2025-03-01 NOTE — ED PROVIDER NOTES
Patient Seen in: [facility] In [place]      History     Chief Complaint   Patient presents with    Fever    Cough/URI    Difficulty Breathing     Stated Complaint: fever, diabetic, short of breath, wife has flu    Subjective:   HPI      74-year-old gentleman with moderate medical history as listed below.  His wife is currently hospitalized with influenza.  He was started on Tamiflu empirically.  In the last 48 hours he has developed some cough and congestion.  Patient is a diabetic.  Yesterday evening, he had checked his blood sugar and noted it to be low, in the 50s.  He drink some orange juice and it normalized.  He currently denies any chest pain or shortness of breath.    Objective:     Past Medical History:    ALLERGIC RHINITIS    Anxiety state    Arrhythmia    Arthritis    Asthma (HCC)    Atherosclerosis of coronary artery    Back problem    back pain    BPH    CANCER    bladder    Cancer (HCC)    bladder cancer in 1991    Depression    DIABETES    Diabetes (HCC)    Esophageal reflux    Essential hypertension    Extrinsic asthma, unspecified    GERD    GOUT    High blood pressure    High cholesterol    Hx of motion sickness    Hyperlipidemia    HYPERTENSION    OBESITY    SLEEP APNEA    BiPAP 25/19    Sleep apnea    bipap    Slow urinary stream              Past Surgical History:   Procedure Laterality Date    Cardiac pacemaker placement      Cataract      Colonoscopy      Colonoscopy N/A 1/23/2023    Procedure: COLONOSCOPY with hot snare polypectomy, cold snare polypectomy and endoclipping x3;  Surgeon: Jericho Otero MD;  Location:  ENDOSCOPY    Colonoscopy N/A 2/1/2024    Procedure: COLONOSCOPY WITH COLD SNARE POLYPECTOMY;  Surgeon: Jericho Otero MD;  Location:  ENDOSCOPY    Other surgical history  1991    bladder cancer    Other surgical history  2020    cyst on leg    Other surgical history  2015    rectum    Other surgical history  11/2009    Cataract surgery both eyes     Other surgical history  09/03/2012    right knee                 Social History     Socioeconomic History    Marital status:    Tobacco Use    Smoking status: Never     Passive exposure: Never    Smokeless tobacco: Never   Vaping Use    Vaping status: Never Used   Substance and Sexual Activity    Alcohol use: No    Drug use: No                  Physical Exam     ED Triage Vitals [03/01/25 1213]   BP (!) 169/83   Pulse 63   Resp 18   Temp 98.5 °F (36.9 °C)   Temp src Oral   SpO2 98 %   O2 Device None (Room air)       Current Vitals:   Vital Signs  BP: (!) 169/83  Pulse: 63  Resp: 18  Temp: 98.5 °F (36.9 °C)  Temp src: Oral    Oxygen Therapy  SpO2: 98 %  O2 Device: None (Room air)        Physical Exam     Gen: Well appearing, well groomed, alert and aware x 3  Neck: Supple, full range of motion, no thyromegaly or lymphadenopathy.  Eye examination: EOMs are intact, normal conjunctival  ENT: No injection noted to the bilateral auditory canals; no loss of landmarks. Normal nasal mucosa without audible nasal congestion.  Oropharynx is patent without evidence of erythema, exudates or deviation.  No stridor to auscultation  Lung: No distress, RR, no retraction, breath sounds are clear bilaterally  Cardio: Regular rate and rhythm, normal S1-S2, no murmur appreciable  Skin: No sign of trauma, Skin warm and dry, no induration or sign of infection.  No rash noted    ED Course     Labs Reviewed   POCT GLUCOSE - Abnormal; Notable for the following components:       Result Value    POC Glucose 228 (*)     All other components within normal limits   POCT FLU TEST - Abnormal; Notable for the following components:    POCT INFLUENZA A Positive (*)     All other components within normal limits    Narrative:     This assay is a rapid molecular in vitro test utilizing nucleic acid amplification of influenza A and B viral RNA.   RAPID SARS-COV-2 BY PCR - Normal       XR CHEST PA + LAT CHEST (CPT=71046)    Result Date:  3/1/2025  PROCEDURE:  XR CHEST PA + LAT CHEST (CPT=71046)  INDICATIONS:  fever, diabetic, short of breath, wife has flu  COMPARISON:  [facility] , XR, XR CHEST PA + LAT CHEST (UQM=21195), 5/17/2018, 7:09 AM.  TECHNIQUE:  PA and lateral chest radiographs were obtained.  PATIENT STATED HISTORY: (As transcribed by Technologist)  2 day history fever and shortness of breath. Flu exposure at home.   FINDINGS:  The cardiac shadow is enlarged appearing stable in this regard.  No evidence for acute CHF. The lungs are clear.  The costophrenic angles are sharp.  There is no active disease seen.  Stable pacemaker.  Chronic calcification around the right apex.            CONCLUSION:  The cardiac shadow is enlarged. However, there is no sign of CHF, pneumonia, effusion, edema, or other acute process.   LOCATION:  [facility]   Dictated by ([facility]): Venkat Kennedy MD on 3/01/2025 at 12:52 PM     Finalized by ([facility]): Venkat Kennedy MD on 3/01/2025 at 12:53 PM                 MDM      Afebrile.  No tachycardia.     My supervising physician was involved in the management of this patient.     98% room air.  No respiratory distress.  Breath sounds clear.  Accu-Chek of 228.  Influenza and COVID swabs obtained.  Chest x-ray.    Chest x-ray demonstrates no acute consolidation      CONCLUSION:  The cardiac shadow is enlarged. However, there is no sign of CHF, pneumonia, effusion, edema, or other acute process.   LOCATION:  [facility]   Dictated by (CST): Venkat Kennedy MD on 3/01/2025 at 12:52 PM     Finalized by (CST): Venkat Kennedy MD on 3/01/2025 at 12:53 PM        Patient started on albuterol and Tessalon.  Monitor both oxygen saturation and glucose.  Return for any acute worsening    Medical Decision Making      Disposition and Plan     Clinical Impression:  1. Influenza         Disposition:  There is no disposition on file for this visit.  There is no disposition time on file for this visit.    Follow-up:  Grant Wilson MD  [address]  [facility] [id number]  [phone]    Follow up            Medications Prescribed:  Current Discharge Medication List        START taking these medications    Details   benzonatate 100 MG Oral Cap Take 1 capsule (100 mg total) by mouth 3 (three) times daily as needed for cough.  Qty: 30 capsule, Refills: 0      !! albuterol 108 (90 Base) MCG/ACT Inhalation Aero Soln Inhale 2 puffs into the lungs every 4 (four) hours as needed for Wheezing.  Qty: 1 each, Refills: 0       !! - Potential duplicate medications found. Please discuss with provider.              Supplementary Documentation:

## 2025-08-15 ENCOUNTER — HOSPITAL ENCOUNTER (EMERGENCY)
Age: 75
Discharge: HOME | End: 2025-08-15
Attending: STUDENT IN AN ORGANIZED HEALTH CARE EDUCATION/TRAINING PROGRAM

## 2025-08-15 ENCOUNTER — APPOINTMENT (OUTPATIENT)
Dept: GENERAL RADIOLOGY | Age: 75
End: 2025-08-15
Attending: STUDENT IN AN ORGANIZED HEALTH CARE EDUCATION/TRAINING PROGRAM

## 2025-08-15 VITALS
DIASTOLIC BLOOD PRESSURE: 59 MMHG | RESPIRATION RATE: 20 BRPM | TEMPERATURE: 98 F | OXYGEN SATURATION: 100 % | HEART RATE: 63 BPM | BODY MASS INDEX: 42.66 KG/M2 | SYSTOLIC BLOOD PRESSURE: 109 MMHG | HEIGHT: 72 IN | WEIGHT: 315 LBS

## 2025-08-15 DIAGNOSIS — I50.9 ACUTE ON CHRONIC CONGESTIVE HEART FAILURE, UNSPECIFIED HEART FAILURE TYPE (HCC): Primary | ICD-10-CM

## 2025-08-15 LAB
ALBUMIN SERPL-MCNC: 4.1 G/DL (ref 3.2–4.8)
ALBUMIN/GLOB SERPL: 1.6 (ref 1–2)
ALP LIVER SERPL-CCNC: 101 U/L (ref 45–117)
ALT SERPL-CCNC: 21 U/L (ref 10–49)
ANION GAP SERPL CALC-SCNC: 12 MMOL/L (ref 0–18)
AST SERPL-CCNC: 22 U/L (ref ?–34)
BASOPHILS # BLD AUTO: 0.04 X10(3) UL (ref 0–0.2)
BASOPHILS NFR BLD AUTO: 0.4 %
BILIRUB SERPL-MCNC: 0.4 MG/DL (ref 0.2–1.1)
BUN BLD-MCNC: 30 MG/DL (ref 7–18)
BUN BLD-MCNC: 30 MG/DL (ref 9–23)
CALCIUM BLD-MCNC: 9.2 MG/DL (ref 8.7–10.6)
CHLORIDE BLD-SCNC: 103 MMOL/L (ref 98–112)
CHLORIDE SERPL-SCNC: 104 MMOL/L (ref 98–112)
CO2 BLD-SCNC: 22 MMOL/L (ref 21–32)
CO2 SERPL-SCNC: 23 MMOL/L (ref 21–32)
CREAT BLD-MCNC: 1.3 MG/DL (ref 0.7–1.3)
CREAT BLD-MCNC: 1.3 MG/DL (ref 0.7–1.3)
EGFRCR SERPLBLD CKD-EPI 2021: 57 ML/MIN/1.73M2 (ref 60–?)
EGFRCR SERPLBLD CKD-EPI 2021: 57 ML/MIN/1.73M2 (ref 60–?)
EOSINOPHIL # BLD AUTO: 0.39 X10(3) UL (ref 0–0.7)
EOSINOPHIL NFR BLD AUTO: 4.2 %
ERYTHROCYTE [DISTWIDTH] IN BLOOD BY AUTOMATED COUNT: 14 %
GLOBULIN PLAS-MCNC: 2.5 G/DL (ref 2–3.5)
GLUCOSE BLD-MCNC: 142 MG/DL (ref 70–99)
GLUCOSE BLD-MCNC: 144 MG/DL (ref 70–99)
HCT VFR BLD AUTO: 41.5 % (ref 39–53)
HCT VFR BLD CALC: 41 % (ref 37–53)
HGB BLD-MCNC: 14.4 G/DL (ref 13–17.5)
IMM GRANULOCYTES # BLD AUTO: 0.03 X10(3) UL (ref 0–1)
IMM GRANULOCYTES NFR BLD: 0.3 %
ISTAT IONIZED CALCIUM FOR CHEM 8: 1.18 MMOL/L (ref 1.12–1.32)
LYMPHOCYTES # BLD AUTO: 1.75 X10(3) UL (ref 1–4)
LYMPHOCYTES NFR BLD AUTO: 19 %
MCH RBC QN AUTO: 33.6 PG (ref 26–34)
MCHC RBC AUTO-ENTMCNC: 34.7 G/DL (ref 31–37)
MCV RBC AUTO: 97 FL (ref 80–100)
MONOCYTES # BLD AUTO: 0.67 X10(3) UL (ref 0.1–1)
MONOCYTES NFR BLD AUTO: 7.3 %
NEUTROPHILS # BLD AUTO: 6.33 X10 (3) UL (ref 1.5–7.7)
NEUTROPHILS # BLD AUTO: 6.33 X10(3) UL (ref 1.5–7.7)
NEUTROPHILS NFR BLD AUTO: 68.8 %
NT-PROBNP SERPL-MCNC: 1369 PG/ML (ref ?–450)
OSMOLALITY SERPL CALC.SUM OF ELEC: 297 MOSM/KG (ref 275–295)
PLATELET # BLD AUTO: 232 10(3)UL (ref 150–450)
POTASSIUM BLD-SCNC: 4.3 MMOL/L (ref 3.6–5.1)
POTASSIUM SERPL-SCNC: 4.3 MMOL/L (ref 3.5–5.1)
PROT SERPL-MCNC: 6.6 G/DL (ref 5.7–8.2)
RBC # BLD AUTO: 4.28 X10(6)UL (ref 3.8–5.8)
SODIUM BLD-SCNC: 141 MMOL/L (ref 136–145)
SODIUM SERPL-SCNC: 139 MMOL/L (ref 136–145)
TROPONIN I BLD-MCNC: 0.03 NG/ML (ref ?–0.05)
TROPONIN I SERPL HS-MCNC: 11 NG/L (ref ?–53)
WBC # BLD AUTO: 9.2 X10(3) UL (ref 4–11)

## 2025-08-15 PROCEDURE — 93005 ELECTROCARDIOGRAM TRACING: CPT

## 2025-08-15 PROCEDURE — 83880 ASSAY OF NATRIURETIC PEPTIDE: CPT | Performed by: STUDENT IN AN ORGANIZED HEALTH CARE EDUCATION/TRAINING PROGRAM

## 2025-08-15 PROCEDURE — 99285 EMERGENCY DEPT VISIT HI MDM: CPT

## 2025-08-15 PROCEDURE — 71045 X-RAY EXAM CHEST 1 VIEW: CPT | Performed by: STUDENT IN AN ORGANIZED HEALTH CARE EDUCATION/TRAINING PROGRAM

## 2025-08-15 PROCEDURE — 80047 BASIC METABLC PNL IONIZED CA: CPT

## 2025-08-15 PROCEDURE — 96374 THER/PROPH/DIAG INJ IV PUSH: CPT

## 2025-08-15 PROCEDURE — 93010 ELECTROCARDIOGRAM REPORT: CPT

## 2025-08-15 PROCEDURE — 84484 ASSAY OF TROPONIN QUANT: CPT | Performed by: STUDENT IN AN ORGANIZED HEALTH CARE EDUCATION/TRAINING PROGRAM

## 2025-08-15 PROCEDURE — 84484 ASSAY OF TROPONIN QUANT: CPT

## 2025-08-15 PROCEDURE — 85025 COMPLETE CBC W/AUTO DIFF WBC: CPT | Performed by: STUDENT IN AN ORGANIZED HEALTH CARE EDUCATION/TRAINING PROGRAM

## 2025-08-15 PROCEDURE — 80053 COMPREHEN METABOLIC PANEL: CPT | Performed by: STUDENT IN AN ORGANIZED HEALTH CARE EDUCATION/TRAINING PROGRAM

## 2025-08-15 RX ORDER — FUROSEMIDE 10 MG/ML
40 INJECTION INTRAMUSCULAR; INTRAVENOUS ONCE
Status: COMPLETED | OUTPATIENT
Start: 2025-08-15 | End: 2025-08-15

## 2025-08-16 LAB
ATRIAL RATE: 234 BPM
Q-T INTERVAL: 488 MS
QRS DURATION: 184 MS
QTC CALCULATION (BEZET): 495 MS
R AXIS: -62 DEGREES
T AXIS: 110 DEGREES
VENTRICULAR RATE: 62 BPM

## 2025-08-25 ENCOUNTER — APPOINTMENT (OUTPATIENT)
Dept: CT IMAGING | Age: 75
End: 2025-08-25
Attending: EMERGENCY MEDICINE

## 2025-08-25 ENCOUNTER — HOSPITAL ENCOUNTER (EMERGENCY)
Age: 75
Discharge: LEFT BEFORE DISCHARGE | End: 2025-08-26
Attending: EMERGENCY MEDICINE

## 2025-08-25 DIAGNOSIS — M54.50 ACUTE RIGHT-SIDED LOW BACK PAIN WITHOUT SCIATICA: ICD-10-CM

## 2025-08-25 DIAGNOSIS — N17.9 AKI (ACUTE KIDNEY INJURY): Primary | ICD-10-CM

## 2025-08-25 LAB
ALBUMIN SERPL-MCNC: 4.4 G/DL (ref 3.2–4.8)
ALBUMIN/GLOB SERPL: 1.8 (ref 1–2)
ALP LIVER SERPL-CCNC: 100 U/L (ref 45–117)
ALT SERPL-CCNC: 22 U/L (ref 10–49)
ANION GAP SERPL CALC-SCNC: 10 MMOL/L (ref 0–18)
AST SERPL-CCNC: 22 U/L (ref ?–34)
BASOPHILS # BLD AUTO: 0.08 X10(3) UL (ref 0–0.2)
BASOPHILS NFR BLD AUTO: 0.8 %
BILIRUB SERPL-MCNC: 0.7 MG/DL (ref 0.2–1.1)
BILIRUB UR QL STRIP.AUTO: NEGATIVE
BUN BLD-MCNC: 47 MG/DL (ref 9–23)
CALCIUM BLD-MCNC: 9.4 MG/DL (ref 8.7–10.6)
CHLORIDE SERPL-SCNC: 101 MMOL/L (ref 98–112)
CLARITY UR REFRACT.AUTO: CLEAR
CO2 SERPL-SCNC: 25 MMOL/L (ref 21–32)
COLOR UR AUTO: YELLOW
CREAT BLD-MCNC: 1.99 MG/DL (ref 0.7–1.3)
EGFRCR SERPLBLD CKD-EPI 2021: 34 ML/MIN/1.73M2 (ref 60–?)
EOSINOPHIL # BLD AUTO: 0.45 X10(3) UL (ref 0–0.7)
EOSINOPHIL NFR BLD AUTO: 4.5 %
ERYTHROCYTE [DISTWIDTH] IN BLOOD BY AUTOMATED COUNT: 13.3 %
GLOBULIN PLAS-MCNC: 2.5 G/DL (ref 2–3.5)
GLUCOSE BLD-MCNC: 295 MG/DL (ref 70–99)
GLUCOSE UR STRIP.AUTO-MCNC: NEGATIVE MG/DL
HCT VFR BLD AUTO: 43.3 % (ref 39–53)
HGB BLD-MCNC: 15 G/DL (ref 13–17.5)
IMM GRANULOCYTES # BLD AUTO: 0.05 X10(3) UL (ref 0–1)
IMM GRANULOCYTES NFR BLD: 0.5 %
KETONES UR STRIP.AUTO-MCNC: NEGATIVE MG/DL
LEUKOCYTE ESTERASE UR QL STRIP.AUTO: NEGATIVE
LYMPHOCYTES # BLD AUTO: 1.64 X10(3) UL (ref 1–4)
LYMPHOCYTES NFR BLD AUTO: 16.5 %
MCH RBC QN AUTO: 33.8 PG (ref 26–34)
MCHC RBC AUTO-ENTMCNC: 34.6 G/DL (ref 31–37)
MCV RBC AUTO: 97.5 FL (ref 80–100)
MONOCYTES # BLD AUTO: 0.59 X10(3) UL (ref 0.1–1)
MONOCYTES NFR BLD AUTO: 5.9 %
NEUTROPHILS # BLD AUTO: 7.15 X10 (3) UL (ref 1.5–7.7)
NEUTROPHILS # BLD AUTO: 7.15 X10(3) UL (ref 1.5–7.7)
NEUTROPHILS NFR BLD AUTO: 71.8 %
NITRITE UR QL STRIP.AUTO: NEGATIVE
OSMOLALITY SERPL CALC.SUM OF ELEC: 305 MOSM/KG (ref 275–295)
PH UR STRIP.AUTO: 7 (ref 5–8)
PLATELET # BLD AUTO: 275 10(3)UL (ref 150–450)
POTASSIUM SERPL-SCNC: 4.7 MMOL/L (ref 3.5–5.1)
PROT SERPL-MCNC: 6.9 G/DL (ref 5.7–8.2)
RBC # BLD AUTO: 4.44 X10(6)UL (ref 3.8–5.8)
RBC UR QL AUTO: NEGATIVE
SODIUM SERPL-SCNC: 136 MMOL/L (ref 136–145)
SP GR UR STRIP.AUTO: 1.02 (ref 1–1.03)
UROBILINOGEN UR STRIP.AUTO-MCNC: 0.2 MG/DL
WBC # BLD AUTO: 10 X10(3) UL (ref 4–11)

## 2025-08-25 PROCEDURE — 85025 COMPLETE CBC W/AUTO DIFF WBC: CPT | Performed by: EMERGENCY MEDICINE

## 2025-08-25 PROCEDURE — 85025 COMPLETE CBC W/AUTO DIFF WBC: CPT

## 2025-08-25 PROCEDURE — 87086 URINE CULTURE/COLONY COUNT: CPT | Performed by: EMERGENCY MEDICINE

## 2025-08-25 PROCEDURE — 80053 COMPREHEN METABOLIC PANEL: CPT

## 2025-08-25 PROCEDURE — 81015 MICROSCOPIC EXAM OF URINE: CPT

## 2025-08-25 PROCEDURE — 81001 URINALYSIS AUTO W/SCOPE: CPT

## 2025-08-25 PROCEDURE — 80053 COMPREHEN METABOLIC PANEL: CPT | Performed by: EMERGENCY MEDICINE

## 2025-08-25 PROCEDURE — 81001 URINALYSIS AUTO W/SCOPE: CPT | Performed by: EMERGENCY MEDICINE

## 2025-08-25 PROCEDURE — 74176 CT ABD & PELVIS W/O CONTRAST: CPT | Performed by: EMERGENCY MEDICINE

## 2025-08-25 PROCEDURE — 99284 EMERGENCY DEPT VISIT MOD MDM: CPT

## 2025-08-25 PROCEDURE — 36415 COLL VENOUS BLD VENIPUNCTURE: CPT

## 2025-08-25 RX ORDER — TORSEMIDE 20 MG/1
40 TABLET ORAL DAILY
COMMUNITY

## 2025-08-26 VITALS
SYSTOLIC BLOOD PRESSURE: 135 MMHG | DIASTOLIC BLOOD PRESSURE: 91 MMHG | OXYGEN SATURATION: 98 % | RESPIRATION RATE: 15 BRPM | WEIGHT: 315 LBS | BODY MASS INDEX: 44.1 KG/M2 | TEMPERATURE: 99 F | HEIGHT: 71 IN | HEART RATE: 60 BPM

## (undated) DEVICE — REM POLYHESIVE ADULT PATIENT RETURN ELECTRODE: Brand: VALLEYLAB

## (undated) DEVICE — TRAP POLYP W/ 2 SPEC TY CLR MAGNIFYING WIND

## (undated) DEVICE — TRAP SPEC REMOVAL ETRAP 15CM

## (undated) DEVICE — CLIP HEMOSTASIS LOCKADO 16X235

## (undated) DEVICE — CANNULA NSL AD W/ FLTR 14FT O2/CO2

## (undated) DEVICE — ELECTRODE EKG W3.5XL4CM ABRAD W1.25XL1.5IN

## (undated) DEVICE — KIT VLV 5 PC AIR H2O SUCT BX ENDOGATOR CONN

## (undated) DEVICE — Device: Brand: DEFENDO AIR/WATER/SUCTION AND BIOPSY VALVE

## (undated) DEVICE — NET SPECIMEN RETRIEVAL BLUE

## (undated) DEVICE — ENDOSCOPY PACK UPPER: Brand: MEDLINE INDUSTRIES, INC.

## (undated) DEVICE — SNARE CAPTIFLEX MICRO-OVL OLY

## (undated) DEVICE — SNARE LARIAT HEXAGONAL 10X28

## (undated) DEVICE — KIT CUSTOM ENDOPROCEDURE STERIS

## (undated) DEVICE — ENDOSCOPY PACK - LOWER: Brand: MEDLINE INDUSTRIES, INC.

## (undated) DEVICE — CANISTER SUCT 1200CC LID BLU HRD ADPT AUTO

## (undated) DEVICE — 3M™ RED DOT™ MONITORING ELECTRODE WITH FOAM TAPE AND STICKY GEL, 50/BAG, 20/CASE, 72/PLT 2570: Brand: RED DOT™

## (undated) DEVICE — FILTERLINE NASAL ADULT O2/CO2

## (undated) DEVICE — FORCEP BIOPSY RJ4 LG CAP W/ND

## (undated) DEVICE — SNARE POLYP L230CM HEX 15MM MIN WRK CHN 2.8MM

## (undated) DEVICE — CLIP LGT 11MM OPEN 2.8MM 235CM

## (undated) DEVICE — 1200CC GUARDIAN II: Brand: GUARDIAN

## (undated) NOTE — ED AVS SNAPSHOT
[facility] in [address] [place] [address]    Phone:  [phone]    Fax:  Yusef    MRN: [id number]    Department:  [facility] in [place]   Date of Visit ACCU-CHEK MULTICLIX LANCETS Misc       BD PEN NEEDLE ULTRAFINE 29G X 12.7MM Misc   Generic drug:  Insulin Pen Needle                 Discharge Instructions       No caffeine, no alcohol. Continue Nexium. May use Maalox as needed.   Follow-up for further primary care or specialist physician will see patients referred from the [facility] Emergency Department. Follow-up care is at the discretion of that Physician.     IF THERE IS ANY CHANGE OR WORSENING OF YOUR CONDITION, CALL YOUR PRIMARY CARE PHYSICIAN harming yourself, contact [facility] at [phone]. - If you don’t have insurance, [facility] has partnered with [facility] [address] to help you get signed up for insurance coverage.   Patient [place] characterized but absent a history of malignancy or risk factors for hepatoma, it is consistent with a hemangioma. Its nonvisualization on the prior exam may related to suboptimal   imaging secondary to body habitus.      2. No gallstones or biliary dilatat For medical emergencies, dial 911.

## (undated) NOTE — ED AVS SNAPSHOT
[facility] in [address] [facility]    Phone:  [phone]    Fax:  [phone]   MRN: [id number]    Department:  [facility] in [place]   Date of Visit IF THERE IS ANY CHANGE OR WORSENING OF YOUR CONDITION, CALL YOUR PRIMARY CARE PHYSICIAN AT ONCE OR RETURN IMMEDIATELY TO THE EMERGENCY DEPARTMENT.     If you have been prescribed any medication(s), please fill your prescription right away and begin taking t

## (undated) NOTE — LETTER
[facility] [address], [address]  Consent for Procedure/Sedation    Date: 5/15/18    Time: 1800      1. I authorize the performance upon Temitope Handing the following:  Permanent Pacemaker Implantation     2.  I authorize Dr. Perry Robison ________________________________    ___________________    Witness: _________________________      Date: ___________________    Printed: 5/15/2018   5:50 PM  Patient Name: Beny Monahan        : 10/29/1950       Medical Record #: [id number]